# Patient Record
Sex: MALE | Race: WHITE | NOT HISPANIC OR LATINO | Employment: FULL TIME | URBAN - METROPOLITAN AREA
[De-identification: names, ages, dates, MRNs, and addresses within clinical notes are randomized per-mention and may not be internally consistent; named-entity substitution may affect disease eponyms.]

---

## 2018-07-12 ENCOUNTER — HOSPITAL ENCOUNTER (OUTPATIENT)
Dept: GENERAL RADIOLOGY | Facility: HOSPITAL | Age: 53
Discharge: HOME | End: 2018-07-12
Attending: FAMILY MEDICINE | Admitting: FAMILY MEDICINE

## 2019-04-24 ENCOUNTER — HOSPITAL ENCOUNTER (OUTPATIENT)
Dept: OTHER | Facility: HOSPITAL | Age: 54
Discharge: HOME | End: 2019-04-24
Attending: FAMILY MEDICINE | Admitting: FAMILY MEDICINE

## 2019-05-01 ENCOUNTER — OFFICE (OUTPATIENT)
Dept: URBAN - METROPOLITAN AREA CLINIC 64 | Facility: CLINIC | Age: 54
End: 2019-05-01

## 2019-05-01 VITALS
HEART RATE: 71 BPM | WEIGHT: 315 LBS | DIASTOLIC BLOOD PRESSURE: 58 MMHG | HEIGHT: 76 IN | SYSTOLIC BLOOD PRESSURE: 121 MMHG

## 2019-05-01 DIAGNOSIS — R10.13 EPIGASTRIC PAIN: ICD-10-CM

## 2019-05-01 PROCEDURE — 99242 OFF/OP CONSLTJ NEW/EST SF 20: CPT | Performed by: INTERNAL MEDICINE

## 2019-05-01 RX ORDER — PANTOPRAZOLE SODIUM 40 MG/1
40 TABLET, DELAYED RELEASE ORAL
Qty: 30 | Refills: 11 | Status: ACTIVE
Start: 2019-05-01

## 2019-06-27 RX ORDER — COLCHICINE 0.6 MG/1
0.6 TABLET ORAL DAILY
COMMUNITY
End: 2019-07-26 | Stop reason: SDUPTHER

## 2019-06-27 RX ORDER — ALBUTEROL SULFATE 90 UG/1
2 AEROSOL, METERED RESPIRATORY (INHALATION) EVERY 4 HOURS PRN
COMMUNITY
End: 2021-05-17

## 2019-06-27 RX ORDER — IBUPROFEN 800 MG/1
800 TABLET ORAL 3 TIMES DAILY PRN
COMMUNITY
Start: 2016-05-26 | End: 2020-01-13

## 2019-06-27 RX ORDER — IBUPROFEN 800 MG/1
TABLET ORAL
Qty: 90 TABLET | Refills: 3 | Status: SHIPPED | OUTPATIENT
Start: 2019-06-27 | End: 2019-07-15 | Stop reason: SDUPTHER

## 2019-06-27 RX ORDER — LORATADINE 10 MG/1
10 CAPSULE, LIQUID FILLED ORAL DAILY
COMMUNITY
End: 2019-10-22 | Stop reason: SDUPTHER

## 2019-06-27 NOTE — TELEPHONE ENCOUNTER
Patient was last seen on, 4-    Patient has an appointment to be seen on, Does not have a follow up appointment scheduled.

## 2019-07-15 ENCOUNTER — OFFICE VISIT (OUTPATIENT)
Dept: FAMILY MEDICINE CLINIC | Facility: CLINIC | Age: 54
End: 2019-07-15

## 2019-07-15 VITALS
HEART RATE: 68 BPM | WEIGHT: 315 LBS | OXYGEN SATURATION: 97 % | SYSTOLIC BLOOD PRESSURE: 140 MMHG | TEMPERATURE: 98.1 F | HEIGHT: 75 IN | BODY MASS INDEX: 39.17 KG/M2 | RESPIRATION RATE: 18 BRPM | DIASTOLIC BLOOD PRESSURE: 86 MMHG

## 2019-07-15 DIAGNOSIS — M25.532 BILATERAL WRIST PAIN: ICD-10-CM

## 2019-07-15 DIAGNOSIS — M25.531 BILATERAL WRIST PAIN: ICD-10-CM

## 2019-07-15 DIAGNOSIS — M54.2 NECK PAIN: Primary | ICD-10-CM

## 2019-07-15 DIAGNOSIS — R20.0 BILATERAL HAND NUMBNESS: ICD-10-CM

## 2019-07-15 PROCEDURE — 99214 OFFICE O/P EST MOD 30 MIN: CPT | Performed by: FAMILY MEDICINE

## 2019-07-15 RX ORDER — PANTOPRAZOLE SODIUM 40 MG/1
TABLET, DELAYED RELEASE ORAL
COMMUNITY
Start: 2019-07-09 | End: 2021-05-21 | Stop reason: SDUPTHER

## 2019-07-15 NOTE — PROGRESS NOTES
Rooming Tab(CC,VS,Pt Hx,Fall Screen)  Chief Complaint   Patient presents with   • Numbness       Subjective   Alexander Wayne is a 54 y.o. male.     Upper Extremity Issue   This is a new problem. The current episode started more than 1 year ago. The problem occurs intermittently. Associated symptoms include neck pain and numbness. Pertinent negatives include no abdominal pain, arthralgias, chest pain, coughing, fever, nausea, rash or vomiting.        The following portions of the patient's history were reviewed and updated as appropriate: allergies, current medications, past family history, past medical history, past social history, past surgical history and problem list.    Patient Care Team:  Melissa Waldron MD as PCP - General  Melissa Waldron MD as PCP - Family Medicine    Problem List Tab  Medications Tab  Synopsis Tab  Chart Review Tab  Care Everywhere Tab  Immunizations Tab  Patient History Tab    Social History     Tobacco Use   • Smoking status: Former Smoker     Last attempt to quit: 10/21/2015     Years since quitting: 3.7   • Smokeless tobacco: Former User   Substance Use Topics   • Alcohol use: Yes     Comment: SOCIAL       Review of Systems   Constitutional: Negative for activity change and fever.   HENT: Negative for ear pain, rhinorrhea, sinus pressure and voice change.    Eyes: Negative for visual disturbance.   Respiratory: Negative for cough and shortness of breath.    Cardiovascular: Negative for chest pain.   Gastrointestinal: Negative for abdominal pain, diarrhea, nausea and vomiting.   Endocrine: Negative for cold intolerance and heat intolerance.   Genitourinary: Negative for frequency and urgency.   Musculoskeletal: Positive for neck pain. Negative for arthralgias.   Skin: Negative for rash.   Neurological: Positive for numbness. Negative for syncope.   Hematological: Does not bruise/bleed easily.   Psychiatric/Behavioral: Negative for depressed mood. The patient is not nervous/anxious.   "      Objective     Rooming Tab(CC,VS,Pt Hx,Fall Screen)  /86   Pulse 68   Temp 98.1 °F (36.7 °C)   Resp 18   Ht 190.5 cm (75\")   Wt (!) 169 kg (372 lb 3.2 oz)   SpO2 97%   BMI 46.52 kg/m²     Body mass index is 46.52 kg/m².    Physical Exam   Constitutional: He is oriented to person, place, and time. He appears well-developed and well-nourished. He is cooperative.   Neck: Normal range of motion and full passive range of motion without pain. Neck supple.   Cardiovascular: Normal rate, regular rhythm and normal heart sounds. Exam reveals no gallop and no friction rub.   No murmur heard.  Pulmonary/Chest: Effort normal and breath sounds normal. He has no wheezes. He has no rales.   Musculoskeletal:        Right wrist: He exhibits tenderness.        Left wrist: He exhibits tenderness.   + phalen and tinel   Neurological: He is alert and oriented to person, place, and time. Coordination normal.   Skin: Skin is warm and dry.   Psychiatric: He has a normal mood and affect.   Vitals reviewed.             Assessment/Plan   Order Review Tab  Health Maintenance Tab  Patient Plan/Order Tab    Problem List Items Addressed This Visit     None      Visit Diagnoses     Neck pain    -  Primary    Relevant Orders    XR Spine Cervical Complete 4 or 5 View    Bilateral wrist pain        Relevant Orders    EMG All Extremities (Completed)    Nerve Conduction Test All Extremities    Bilateral hand numbness            Will get EMG and send to hand surgeon    Wrapup Tab  No Follow-up on file.                   "

## 2019-07-19 ENCOUNTER — HOSPITAL ENCOUNTER (OUTPATIENT)
Dept: NEUROLOGY | Facility: HOSPITAL | Age: 54
Discharge: HOME | End: 2019-07-19
Admitting: FAMILY MEDICINE

## 2019-07-19 DIAGNOSIS — M25.531 BILATERAL WRIST PAIN: ICD-10-CM

## 2019-07-19 DIAGNOSIS — M25.532 BILATERAL WRIST PAIN: ICD-10-CM

## 2019-07-19 PROCEDURE — 95910 NRV CNDJ TEST 7-8 STUDIES: CPT

## 2019-07-19 PROCEDURE — 95886 MUSC TEST DONE W/N TEST COMP: CPT | Performed by: PSYCHIATRY & NEUROLOGY

## 2019-07-19 PROCEDURE — 95886 MUSC TEST DONE W/N TEST COMP: CPT

## 2019-07-19 PROCEDURE — 95910 NRV CNDJ TEST 7-8 STUDIES: CPT | Performed by: PSYCHIATRY & NEUROLOGY

## 2019-07-19 NOTE — PROCEDURES
EMG REPORT    Indication: Hand numbness    Findings: Right median sensory study showed no reliable response.  Left median sensory latency was prolonged at 6.3 ms.  Bilateral ulnar sensory and motor study showed normal latencies and amplitudes.  Right median motor study showed no reliable response.  Left median motor study showed a prolonged distal latency of 14.9 ms with small amplitude response and velocity of 44.2 m/s.    Concentric needle EMG of bilateral deltoid, triceps, brachioradialis, extensor digitorum, and first dorsal interosseous muscles showed no abnormality.    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  The abnormalities are severe in degree and most likely emanates from the carpal tunnel region.  Needle EMG of both upper extremities failed to show evidence of superimposed cervical radiculopathy.       Rashawn Negrete M.D.

## 2019-07-26 RX ORDER — COLCHICINE 0.6 MG/1
TABLET ORAL
Qty: 60 TABLET | Refills: 12 | Status: SHIPPED | OUTPATIENT
Start: 2019-07-26 | End: 2020-09-22

## 2019-10-22 RX ORDER — LORATADINE 10 MG/1
TABLET ORAL
Qty: 30 TABLET | Refills: 12 | Status: SHIPPED | OUTPATIENT
Start: 2019-10-22 | End: 2020-12-28

## 2020-01-13 RX ORDER — IBUPROFEN 800 MG/1
TABLET ORAL
Qty: 90 TABLET | Refills: 3 | Status: SHIPPED | OUTPATIENT
Start: 2020-01-13 | End: 2020-07-26

## 2020-04-27 ENCOUNTER — TELEPHONE (OUTPATIENT)
Dept: FAMILY MEDICINE CLINIC | Facility: CLINIC | Age: 55
End: 2020-04-27

## 2020-04-27 DIAGNOSIS — R21 RASH: ICD-10-CM

## 2020-04-27 DIAGNOSIS — R21 RASH: Primary | ICD-10-CM

## 2020-04-29 ENCOUNTER — TELEPHONE (OUTPATIENT)
Dept: NEUROLOGY | Facility: CLINIC | Age: 55
End: 2020-04-29

## 2020-04-29 NOTE — TELEPHONE ENCOUNTER
We cannot supply order until pt is seen. He is to be seen yearly. He will need new sleep appt with Dr. Seipel in order to get anything from us. Please call and schedule. Thanks

## 2020-04-29 NOTE — TELEPHONE ENCOUNTER
Pt is calling stating he is needing a new Mask for his CPAP machine. Pt hasn't seen Dr. Seipel since 2016.  Does pt need to be scheduled as a New patient with Dr. Seipel to get his CPAP supplies or can an order be sent to [email or web address]?      Please advise [phone]

## 2020-04-30 NOTE — TELEPHONE ENCOUNTER
Called pt and he does not want to make an appt at this time. I advised for pt to call us when he is ready to schedule an appt.     JED

## 2020-07-26 RX ORDER — IBUPROFEN 800 MG/1
TABLET ORAL
Qty: 90 TABLET | Refills: 3 | Status: SHIPPED | OUTPATIENT
Start: 2020-07-26 | End: 2021-11-29

## 2020-09-22 RX ORDER — COLCHICINE 0.6 MG/1
TABLET ORAL
Qty: 60 TABLET | Refills: 12 | Status: SHIPPED | OUTPATIENT
Start: 2020-09-22 | End: 2020-09-25

## 2020-09-25 RX ORDER — COLCHICINE 0.6 MG/1
TABLET ORAL
Qty: 60 TABLET | Refills: 12 | Status: SHIPPED | OUTPATIENT
Start: 2020-09-25 | End: 2020-09-28 | Stop reason: SDUPTHER

## 2020-09-28 ENCOUNTER — TELEPHONE (OUTPATIENT)
Dept: FAMILY MEDICINE CLINIC | Facility: CLINIC | Age: 55
End: 2020-09-28

## 2020-09-28 RX ORDER — COLCHICINE 0.6 MG/1
0.6 TABLET ORAL 2 TIMES DAILY PRN
Qty: 60 TABLET | Refills: 12 | Status: SHIPPED | OUTPATIENT
Start: 2020-09-28 | End: 2021-11-29

## 2020-10-20 ENCOUNTER — OFFICE VISIT (OUTPATIENT)
Dept: FAMILY MEDICINE CLINIC | Facility: CLINIC | Age: 55
End: 2020-10-20

## 2020-10-20 VITALS
HEART RATE: 65 BPM | DIASTOLIC BLOOD PRESSURE: 86 MMHG | TEMPERATURE: 98.3 F | BODY MASS INDEX: 38.36 KG/M2 | HEIGHT: 76 IN | WEIGHT: 315 LBS | SYSTOLIC BLOOD PRESSURE: 132 MMHG | RESPIRATION RATE: 18 BRPM | OXYGEN SATURATION: 92 %

## 2020-10-20 DIAGNOSIS — E66.01 MORBID OBESITY (HCC): ICD-10-CM

## 2020-10-20 DIAGNOSIS — Z00.01 ENCOUNTER FOR GENERAL ADULT MEDICAL EXAMINATION WITH ABNORMAL FINDINGS: Primary | ICD-10-CM

## 2020-10-20 DIAGNOSIS — M25.522 LEFT ELBOW PAIN: ICD-10-CM

## 2020-10-20 DIAGNOSIS — M77.12 LATERAL EPICONDYLITIS OF LEFT ELBOW: ICD-10-CM

## 2020-10-20 DIAGNOSIS — Z13.220 SCREENING FOR HYPERLIPIDEMIA: ICD-10-CM

## 2020-10-20 DIAGNOSIS — Z12.5 SCREENING FOR PROSTATE CANCER: ICD-10-CM

## 2020-10-20 PROBLEM — M1A.09X0 IDIOPATHIC CHRONIC GOUT OF MULTIPLE SITES WITHOUT TOPHUS: Status: ACTIVE | Noted: 2020-10-20

## 2020-10-20 PROBLEM — K40.90 INGUINAL HERNIA: Status: ACTIVE | Noted: 2020-10-20

## 2020-10-20 PROBLEM — J30.1 SEASONAL ALLERGIC RHINITIS DUE TO POLLEN: Status: ACTIVE | Noted: 2020-10-20

## 2020-10-20 PROBLEM — L57.0 AK (ACTINIC KERATOSIS): Status: ACTIVE | Noted: 2020-10-20

## 2020-10-20 PROBLEM — L03.115 CELLULITIS OF RIGHT LOWER EXTREMITY: Status: RESOLVED | Noted: 2020-10-20 | Resolved: 2020-10-20

## 2020-10-20 PROBLEM — D69.6 THROMBOCYTOPENIA: Status: ACTIVE | Noted: 2020-10-20

## 2020-10-20 PROBLEM — K27.9 PEPTIC ULCER: Status: ACTIVE | Noted: 2020-10-20

## 2020-10-20 PROBLEM — Z87.891 HISTORY OF TOBACCO USE: Status: ACTIVE | Noted: 2020-10-20

## 2020-10-20 PROBLEM — R60.0 EDEMA, LOWER EXTREMITY: Status: ACTIVE | Noted: 2020-10-20

## 2020-10-20 PROBLEM — L03.115 CELLULITIS OF RIGHT LOWER EXTREMITY: Status: ACTIVE | Noted: 2020-10-20

## 2020-10-20 PROBLEM — R10.11 RUQ PAIN: Status: ACTIVE | Noted: 2020-10-20

## 2020-10-20 PROBLEM — Z86.19 HISTORY OF CHICKEN POX: Status: ACTIVE | Noted: 2020-10-20

## 2020-10-20 PROBLEM — M25.561 PAIN, JOINT, KNEE, RIGHT: Status: ACTIVE | Noted: 2020-10-20

## 2020-10-20 PROBLEM — M77.10 LATERAL EPICONDYLITIS (TENNIS ELBOW): Status: ACTIVE | Noted: 2020-10-20

## 2020-10-20 PROBLEM — H91.93 BILATERAL HEARING LOSS: Status: ACTIVE | Noted: 2020-10-20

## 2020-10-20 PROBLEM — K42.9 UMBILICAL HERNIA: Status: ACTIVE | Noted: 2020-10-20

## 2020-10-20 PROBLEM — R09.82 POST-NASAL DRIP: Status: ACTIVE | Noted: 2020-10-20

## 2020-10-20 LAB
BILIRUB BLD-MCNC: NEGATIVE MG/DL
CLARITY, POC: CLEAR
COLOR UR: YELLOW
GLUCOSE UR STRIP-MCNC: NEGATIVE MG/DL
KETONES UR QL: NEGATIVE
LEUKOCYTE EST, POC: NEGATIVE
NITRITE UR-MCNC: NEGATIVE MG/ML
PH UR: 6.5 [PH] (ref 5–8)
PROT UR STRIP-MCNC: ABNORMAL MG/DL
RBC # UR STRIP: NEGATIVE /UL
SP GR UR: 1 (ref 1–1.03)
UROBILINOGEN UR QL: NORMAL

## 2020-10-20 PROCEDURE — 99212 OFFICE O/P EST SF 10 MIN: CPT | Performed by: FAMILY MEDICINE

## 2020-10-20 PROCEDURE — 99396 PREV VISIT EST AGE 40-64: CPT | Performed by: FAMILY MEDICINE

## 2020-10-20 PROCEDURE — 81003 URINALYSIS AUTO W/O SCOPE: CPT | Performed by: FAMILY MEDICINE

## 2020-10-20 NOTE — PROGRESS NOTES
"  Chief Complaint   Patient presents with   • Annual Exam   • Elbow Pain   • Cough       Subjective   Alexander Wayne is a 55 y.o. male here for a Annual Visit. Energy level is described as fair and he is sleeping poorly. He sleeps 5 hours nightly. Patient exercises regularly 3 times weekly. Nutrition is described as in general, a \"healthy\" diet  . His   libido is normal. He reports that he does not perform monthly testicular exam.    Elbow Pain  This is a new problem. The current episode started more than 1 month ago (3 months ago flipped tractor on top of himself did not got to ER). The problem occurs intermittently. The problem has been waxing and waning. Associated symptoms include coughing and joint swelling. Pertinent negatives include no abdominal pain, arthralgias, chest pain, fatigue, fever, headaches, nausea, rash, sore throat, visual change, vomiting or weakness. Exacerbated by: working it will start aching. He has tried acetaminophen for the symptoms.   Cough  This is a new (due to post nasal drip) problem. The current episode started 1 to 4 weeks ago. The problem has been gradually worsening. The problem occurs every few hours. The cough is productive of sputum. Associated symptoms include nasal congestion and postnasal drip. Pertinent negatives include no chest pain, ear congestion, ear pain, fever, headaches, rash, rhinorrhea, sore throat or shortness of breath. Nothing aggravates the symptoms. Risk factors for lung disease include animal exposure. He has tried nothing for the symptoms. The treatment provided mild relief.        I personally reviewed and updated the patient's allergies, medications, problem list, and past medical, surgical, social, and family history.     Allergies:  Allergies   Allergen Reactions   • Ciprofloxacin Hives       Social History:  Social History     Socioeconomic History   • Marital status:      Spouse name: Not on file   • Number of children: Not on file   • Years " of education: Not on file   • Highest education level: Not on file   Tobacco Use   • Smoking status: Former Smoker     Quit date: 10/21/2015     Years since quittin.0   • Smokeless tobacco: Former User   Substance and Sexual Activity   • Alcohol use: Yes     Comment: SOCIAL   • Drug use: No       Family History:  Family History   Problem Relation Age of Onset   • Colon cancer Mother    • Breast cancer Mother    • Hypertension Father    • Diabetes Maternal Grandmother    • Lung cancer Maternal Grandfather    • Liver cancer Maternal Grandfather        Past Medical History :  Active Ambulatory Problems     Diagnosis Date Noted   • Obstructive sleep apnea 2016   • AK (actinic keratosis) 10/20/2020   • Bilateral hearing loss 10/20/2020   • Edema, lower extremity 10/20/2020   • History of chicken pox 10/20/2020   • Idiopathic chronic gout of multiple sites without tophus 10/20/2020   • Inguinal hernia 10/20/2020   • Morbid obesity (CMS/HCC) 10/20/2020   • History of tobacco use 10/20/2020   • Pain, joint, knee, right 10/20/2020   • Peptic ulcer 10/20/2020   • Post-nasal drip 10/20/2020   • Screening for prostate cancer 10/20/2020   • Seasonal allergic rhinitis due to pollen 10/20/2020   • Thrombocytopenia (CMS/HCC) 10/20/2020   • Umbilical hernia 10/20/2020   • Left elbow pain 10/20/2020   • Lateral epicondylitis (tennis elbow) 10/20/2020     Resolved Ambulatory Problems     Diagnosis Date Noted   • Cellulitis of right lower extremity 10/20/2020   • RUQ pain 10/20/2020     Past Medical History:   Diagnosis Date   • Chronic idiopathic gout of multiple sites without tophus    • Seasonal allergic rhinitis    • Sleep apnea        Medication List:    Current Outpatient Medications:   •  albuterol sulfate  (90 Base) MCG/ACT inhaler, Inhale 2 puffs Every 4 (Four) Hours As Needed. Dx: R05 (Cough), Disp: , Rfl:   •  colchicine 0.6 MG tablet, Take 1 tablet by mouth 2 (Two) Times a Day As Needed for Muscle / Joint  "Pain., Disp: 60 tablet, Rfl: 12  •  ibuprofen (ADVIL,MOTRIN) 800 MG tablet, TAKE 1 TABLET BY MOUTH THREE TIMES A DAY AS NEEDED, Disp: 90 tablet, Rfl: 3  •  loratadine (CLARITIN) 10 MG tablet, TAKE 1 TABLET BY MOUTH EVERY DAY, Disp: 30 tablet, Rfl: 12  •  mupirocin (Bactroban) 2 % ointment, Apply  topically to the appropriate area as directed 3 (Three) Times a Day., Disp: 1 each, Rfl: 0  •  pantoprazole (PROTONIX) 40 MG EC tablet, , Disp: , Rfl:     Past Surgical History:  Past Surgical History:   Procedure Laterality Date   • INGUINAL HERNIA REPAIR     • KNEE LIGAMENT RECONSTRUCTION     • LAMINECTOMY      3    • LUMBAR DISCECTOMY         Depression Screen:   No flowsheet data found.    Fall Risk Screen:  [facility] Fall Risk Assessment has not been completed.    Review Of Systems:  Review of Systems   Constitutional: Negative for activity change, fatigue and fever.   HENT: Positive for postnasal drip. Negative for ear pain, rhinorrhea, sinus pressure, sore throat and voice change.    Eyes: Negative for visual disturbance.   Respiratory: Positive for cough. Negative for shortness of breath.    Cardiovascular: Negative for chest pain.   Gastrointestinal: Negative for abdominal pain, diarrhea, nausea and vomiting.   Endocrine: Negative for cold intolerance and heat intolerance.   Genitourinary: Negative for frequency and urgency.   Musculoskeletal: Positive for joint swelling. Negative for arthralgias.   Skin: Negative for rash.   Neurological: Negative for syncope and weakness.   Hematological: Does not bruise/bleed easily.   Psychiatric/Behavioral: Negative for depressed mood. The patient is not nervous/anxious.        Physical Exam:  Vital Signs:  Visit Vitals  /86   Pulse 65   Temp 98.3 °F (36.8 °C)   Resp 18   Ht 193 cm (76\")   Wt (!) 166 kg (365 lb 12.8 oz)   SpO2 92%   BMI 44.53 kg/m²       Physical Exam  Vitals signs reviewed.   Constitutional:       General: He is not in acute distress.     Appearance: " Normal appearance. He is well-developed. He is not diaphoretic.   HENT:      Head: Normocephalic and atraumatic.      Right Ear: External ear normal. No decreased hearing noted. No tenderness. No middle ear effusion. Tympanic membrane is not erythematous or bulging.      Left Ear: External ear normal. No decreased hearing noted. No tenderness.  No middle ear effusion. Tympanic membrane is not erythematous or bulging.      Nose: Nose normal.      Mouth/Throat:      Pharynx: No oropharyngeal exudate or posterior oropharyngeal erythema.   Eyes:      General: No scleral icterus.        Right eye: No discharge.         Left eye: No discharge.      Conjunctiva/sclera: Conjunctivae normal.      Pupils: Pupils are equal, round, and reactive to light.   Neck:      Musculoskeletal: Normal range of motion and neck supple.      Thyroid: No thyromegaly.      Trachea: No tracheal deviation.   Cardiovascular:      Rate and Rhythm: Normal rate and regular rhythm.      Heart sounds: Normal heart sounds. No murmur. No friction rub. No gallop.    Pulmonary:      Effort: Pulmonary effort is normal. No respiratory distress.      Breath sounds: Normal breath sounds. No stridor. No wheezing or rales.   Abdominal:      General: Bowel sounds are normal. There is no distension.      Palpations: Abdomen is soft. There is no mass.      Tenderness: There is no abdominal tenderness. There is no guarding or rebound.      Hernia: There is no hernia in the left inguinal area.   Genitourinary:     Penis: Normal. No tenderness or discharge.       Scrotum/Testes: Normal.         Right: Mass, tenderness or swelling not present.         Left: Mass, tenderness or swelling not present.   Musculoskeletal: Normal range of motion.         General: No deformity.      Left elbow: Tenderness found. Lateral epicondyle tenderness noted.   Skin:     General: Skin is warm and dry.      Capillary Refill: Capillary refill takes less than 2 seconds.      Coloration:  Skin is not pale.      Findings: No erythema or rash.   Neurological:      Mental Status: He is alert and oriented to person, place, and time. He is not disoriented.      Cranial Nerves: No cranial nerve deficit.      Sensory: No sensory deficit.      Motor: No tremor, atrophy or abnormal muscle tone.      Coordination: Coordination normal.      Gait: Gait normal.      Deep Tendon Reflexes: Reflexes are normal and symmetric. Reflexes normal.   Psychiatric:         Behavior: Behavior normal. Behavior is cooperative.         Thought Content: Thought content normal.         Judgment: Judgment normal.           Assessment and Plan:  Problem List Items Addressed This Visit        Digestive    Morbid obesity (CMS/HCC)       Nervous and Auditory    Left elbow pain    Current Assessment & Plan     Discussed nsaids, lat strap         Relevant Orders    XR Elbow 2 View Left       Musculoskeletal and Integument    Lateral epicondylitis (tennis elbow)       Other    Screening for prostate cancer    Overview     Discussed injury prevention, diet and exercise, safe sexual practices, and screening for common diseases. Encouraged use of sunscreen and seatbelts. Avoidance of tobacco encouraged. Limitation or avoidance of alcohol encouraged. Recommend yearly dental and eye exams. Also discussed monitoring of blood pressure, lipids.  He has had colonoscopy (at age 47)    he has had his prostate looked at         Relevant Orders    PSA Screen      Other Visit Diagnoses     Encounter for general adult medical examination with abnormal findings    -  Primary    Relevant Orders    POCT urinalysis dipstick, multipro (Completed)    Comprehensive Metabolic Panel    CBC & Differential    TSH    Screening for hyperlipidemia        Relevant Orders    Lipid Panel With / Chol / HDL Ratio          An After Visit Summary and PPPS were given to the patient.       Discussed injury prevention, diet and exercise, safe sexual practices, and screening  for common diseases. Encouraged use of sunscreen and seatbelts. Discussed timing of screenings. Encouraged monthly testicular exams, yearly physical exams. Avoidance of tobacco encouraged. Limitation or avoidance of alcohol encouraged. Recommend yearly dental and eye exams. Also discussed monitoring of blood pressure, lipids.     I wore protective equipment throughout this patient encounter to include mask, gloves and eye protection. Hand hygiene was performed before donning protective equipment and after removal when leaving the room.

## 2020-10-25 PROBLEM — R10.11 RUQ PAIN: Status: RESOLVED | Noted: 2020-10-20 | Resolved: 2020-10-25

## 2020-12-01 ENCOUNTER — HOSPITAL ENCOUNTER (OUTPATIENT)
Dept: ULTRASOUND IMAGING | Facility: HOSPITAL | Age: 55
Discharge: HOME | End: 2020-12-01

## 2020-12-01 ENCOUNTER — OFFICE VISIT (OUTPATIENT)
Dept: FAMILY MEDICINE CLINIC | Facility: CLINIC | Age: 55
End: 2020-12-01

## 2020-12-01 VITALS
TEMPERATURE: 98.6 F | HEIGHT: 76 IN | HEART RATE: 80 BPM | SYSTOLIC BLOOD PRESSURE: 140 MMHG | DIASTOLIC BLOOD PRESSURE: 78 MMHG | BODY MASS INDEX: 38.36 KG/M2 | OXYGEN SATURATION: 95 % | WEIGHT: 315 LBS | RESPIRATION RATE: 18 BRPM

## 2020-12-01 DIAGNOSIS — M79.604 RIGHT LEG PAIN: ICD-10-CM

## 2020-12-01 DIAGNOSIS — M79.604 RIGHT LEG PAIN: Primary | ICD-10-CM

## 2020-12-01 PROCEDURE — 99213 OFFICE O/P EST LOW 20 MIN: CPT | Performed by: FAMILY MEDICINE

## 2020-12-01 NOTE — PROGRESS NOTES
Subjective   Alexander Wayne is a 55 y.o. male.     Chief Complaint   Patient presents with   • Leg Swelling   • Leg Pain               Leg Swelling  This is a new (left leg and knee) problem. The current episode started more than 1 month ago. The problem occurs intermittently. The problem has been gradually worsening. Pertinent negatives include no abdominal pain, arthralgias, chest pain, coughing, fever, nausea, rash or vomiting. Associated symptoms comments: Warm to touch 2 days ago. . Nothing aggravates the symptoms. He has tried NSAIDs (colchicine) for the symptoms. The treatment provided mild relief.   Leg Pain   The incident occurred more than 1 week ago (leg cirrculation on the right leg 6 to 7years). There was no injury mechanism. The pain is present in the right thigh, right ankle, right heel, right knee and right foot. The quality of the pain is described as cramping. The pain is at a severity of 0/10. The patient is experiencing no pain. Associated symptoms include tingling. Associated symptoms comments: Right foot. He reports no foreign bodies present. The symptoms are aggravated by palpation. He has tried elevation (compression socks ) for the symptoms. The treatment provided no relief.        The following portions of the patient's history were reviewed and updated as appropriate: allergies, current medications, past family history, past medical history, past social history, past surgical history and problem list.    Allergies:  Allergies   Allergen Reactions   • Ciprofloxacin Hives       Social History:  Social History     Socioeconomic History   • Marital status:      Spouse name: Not on file   • Number of children: Not on file   • Years of education: Not on file   • Highest education level: Not on file   Tobacco Use   • Smoking status: Former Smoker     Quit date: 10/21/2015     Years since quittin.1   • Smokeless tobacco: Former User   Substance and Sexual Activity   • Alcohol use: Yes      Comment: SOCIAL   • Drug use: No       Family History:  Family History   Problem Relation Age of Onset   • Colon cancer Mother    • Breast cancer Mother    • Hypertension Father    • Diabetes Maternal Grandmother    • Lung cancer Maternal Grandfather    • Liver cancer Maternal Grandfather        Past Medical History :  Patient Active Problem List   Diagnosis   • Obstructive sleep apnea   • AK (actinic keratosis)   • Bilateral hearing loss   • Edema, lower extremity   • History of chicken pox   • Idiopathic chronic gout of multiple sites without tophus   • Inguinal hernia   • Morbid obesity (CMS/HCC)   • History of tobacco use   • Pain, joint, knee, right   • Peptic ulcer   • Post-nasal drip   • Screening for prostate cancer   • Seasonal allergic rhinitis due to pollen   • Thrombocytopenia (CMS/HCC)   • Umbilical hernia   • Left elbow pain   • Lateral epicondylitis (tennis elbow)   • Right leg pain       Medication List:    Current Outpatient Medications:   •  colchicine 0.6 MG tablet, Take 1 tablet by mouth 2 (Two) Times a Day As Needed for Muscle / Joint Pain., Disp: 60 tablet, Rfl: 12  •  ibuprofen (ADVIL,MOTRIN) 800 MG tablet, TAKE 1 TABLET BY MOUTH THREE TIMES A DAY AS NEEDED, Disp: 90 tablet, Rfl: 3  •  loratadine (CLARITIN) 10 MG tablet, TAKE 1 TABLET BY MOUTH EVERY DAY, Disp: 30 tablet, Rfl: 12  •  mupirocin (Bactroban) 2 % ointment, Apply  topically to the appropriate area as directed 3 (Three) Times a Day., Disp: 1 each, Rfl: 0  •  albuterol sulfate  (90 Base) MCG/ACT inhaler, Inhale 2 puffs Every 4 (Four) Hours As Needed. Dx: R05 (Cough), Disp: , Rfl:   •  pantoprazole (PROTONIX) 40 MG EC tablet, , Disp: , Rfl:     Past Surgical History:  Past Surgical History:   Procedure Laterality Date   • INGUINAL HERNIA REPAIR     • KNEE LIGAMENT RECONSTRUCTION     • LAMINECTOMY      3    • LUMBAR DISCECTOMY         Review of Systems:  Review of Systems   Constitutional: Negative for activity change and  "fever.   HENT: Negative for ear pain, rhinorrhea, sinus pressure and voice change.    Eyes: Negative for visual disturbance.   Respiratory: Negative for cough and shortness of breath.    Cardiovascular: Negative for chest pain.   Gastrointestinal: Negative for abdominal pain, diarrhea, nausea and vomiting.   Endocrine: Negative for cold intolerance and heat intolerance.   Genitourinary: Negative for frequency and urgency.   Musculoskeletal: Negative for arthralgias.   Skin: Negative for rash.   Neurological: Positive for tingling. Negative for syncope.   Hematological: Does not bruise/bleed easily.   Psychiatric/Behavioral: Negative for depressed mood. The patient is not nervous/anxious.        Physical Exam:  Vital Signs:  Visit Vitals  /78 (BP Location: Right arm)   Pulse 80   Temp 98.6 °F (37 °C) (Temporal)   Resp 18   Ht 193 cm (76\")   Wt (!) 172 kg (378 lb 12.8 oz)   SpO2 95%   BMI 46.11 kg/m²       Physical Exam  Vitals signs reviewed.   Constitutional:       Appearance: Normal appearance. He is well-developed.   HENT:      Head: Normocephalic and atraumatic.   Cardiovascular:      Rate and Rhythm: Normal rate and regular rhythm.      Pulses:           Dorsalis pedis pulses are 2+ on the right side.        Posterior tibial pulses are 2+ on the right side.      Heart sounds: Normal heart sounds. No murmur. No friction rub. No gallop.       Comments: Right leg with trace edema, middle of lower leg with vascular changes.   Pulmonary:      Effort: Pulmonary effort is normal.      Breath sounds: Normal breath sounds. No wheezing or rales.   Skin:     General: Skin is warm and dry.   Neurological:      Mental Status: He is alert and oriented to person, place, and time.      Coordination: Coordination normal.      Gait: Gait normal.   Psychiatric:         Behavior: Behavior is cooperative.         Assessment and Plan:    Problems Addressed this Visit        Nervous and Auditory    Right leg pain - Primary     " He has vascular changes and varicose veins. Attempted doppler but he declined secondary to cost. Trying to get a better price for him. Doubt DVT. No s/s of DVT.           Diagnoses       Codes Comments    Right leg pain    -  Primary ICD-10-CM: M79.604  ICD-9-CM: 729.5           An After Visit Summary and PPPS were given to the patient.             I wore protective equipment throughout this patient encounter to include mask, gloves and eye protection. Hand hygiene was performed before donning protective equipment and after removal when leaving the room.

## 2020-12-04 ENCOUNTER — TELEPHONE (OUTPATIENT)
Dept: FAMILY MEDICINE CLINIC | Facility: CLINIC | Age: 55
End: 2020-12-04

## 2020-12-04 NOTE — TELEPHONE ENCOUNTER
Have you spoken with [facility] or [facility] on cost of the u/s of his right leg? If they are pricey, set him up with a vascualr surgeon

## 2020-12-04 NOTE — ASSESSMENT & PLAN NOTE
He has vascular changes and varicose veins. Attempted doppler but he declined secondary to cost. Trying to get a better price for him. Doubt DVT. No s/s of DVT.

## 2020-12-12 NOTE — TELEPHONE ENCOUNTER
Pt called stating that he cant  his medicine colchicine. Called pharmacy and they dont have it on our end. Can you resend it?   (M6) obeys commands

## 2020-12-28 ENCOUNTER — TELEPHONE (OUTPATIENT)
Dept: FAMILY MEDICINE CLINIC | Facility: CLINIC | Age: 55
End: 2020-12-28

## 2020-12-28 RX ORDER — LORATADINE 10 MG/1
TABLET ORAL
Qty: 90 TABLET | Refills: 3 | Status: SHIPPED | OUTPATIENT
Start: 2020-12-28 | End: 2022-02-27

## 2021-01-08 ENCOUNTER — TELEPHONE (OUTPATIENT)
Dept: FAMILY MEDICINE CLINIC | Facility: CLINIC | Age: 56
End: 2021-01-08

## 2021-01-08 DIAGNOSIS — M79.604 RIGHT LEG PAIN: ICD-10-CM

## 2021-01-08 NOTE — TELEPHONE ENCOUNTER
----- Message from Melissa Waldron MD sent at 1/8/2021  2:18 PM EST -----  I can not see the result from my phone. Can you go over this with me on phone or with on call?

## 2021-01-14 NOTE — TELEPHONE ENCOUNTER
He needs to see Dr Nelson at [facility]. He is a vascular surgeon. He can see him instead of me

## 2021-01-14 NOTE — TELEPHONE ENCOUNTER
Called pt and let him his results he said that it starting to look darker, and he said that he would like to get his varicous veins taken care of. I made him an appt for Monday

## 2021-01-25 ENCOUNTER — TELEPHONE (OUTPATIENT)
Dept: FAMILY MEDICINE CLINIC | Facility: CLINIC | Age: 56
End: 2021-01-25

## 2021-01-25 NOTE — TELEPHONE ENCOUNTER
Made pt and appt with Dr. Elias at [facility] for February 12, 2021 at 9:10am tried contacting the pt but was unsuccessful

## 2021-05-17 ENCOUNTER — OFFICE VISIT (OUTPATIENT)
Dept: FAMILY MEDICINE CLINIC | Facility: CLINIC | Age: 56
End: 2021-05-17

## 2021-05-17 VITALS
RESPIRATION RATE: 18 BRPM | OXYGEN SATURATION: 96 % | HEIGHT: 76 IN | HEART RATE: 71 BPM | WEIGHT: 315 LBS | SYSTOLIC BLOOD PRESSURE: 136 MMHG | BODY MASS INDEX: 38.36 KG/M2 | TEMPERATURE: 97.3 F | DIASTOLIC BLOOD PRESSURE: 88 MMHG

## 2021-05-17 DIAGNOSIS — M54.2 NECK PAIN: ICD-10-CM

## 2021-05-17 DIAGNOSIS — M54.12 CERVICAL RADICULOPATHY: ICD-10-CM

## 2021-05-17 DIAGNOSIS — M62.838 TRAPEZIUS MUSCLE SPASM: Primary | ICD-10-CM

## 2021-05-17 PROBLEM — I87.329 CHRONIC VENOUS HYPERTENSION WITH INFLAMMATION: Status: ACTIVE | Noted: 2021-02-12

## 2021-05-17 PROCEDURE — 99214 OFFICE O/P EST MOD 30 MIN: CPT | Performed by: FAMILY MEDICINE

## 2021-05-17 RX ORDER — CYCLOBENZAPRINE HCL 10 MG
10 TABLET ORAL NIGHTLY PRN
Qty: 30 TABLET | Refills: 0 | Status: SHIPPED | OUTPATIENT
Start: 2021-05-17 | End: 2021-06-23

## 2021-05-17 NOTE — PROGRESS NOTES
Subjective   Alexander Wayne is a 56 y.o. male.     Chief Complaint   Patient presents with   • Neck Pain       Pt said that his neck pain and left hand pain since his COVID shot. Which was 03/10/2021  Pt also said that he has been trouble sleeping he hasn't been seeing his sleep Dr and is needing a new mask      Neck Pain   This is a new problem. The current episode started more than 1 month ago. The problem occurs daily. The problem has been gradually worsening. The pain is associated with nothing. Pain location: back of neck low and sides. The symptoms are aggravated by bending, coughing and twisting. Associated symptoms include headaches. Pertinent negatives include no fever, leg pain, pain with swallowing, photophobia, syncope, trouble swallowing, visual change or weakness. Treatments tried: ibuprofen 800mg. The treatment provided mild relief.      Neck pain started in March. He kept his head down for a while afterwards. Now he is hurting. Pain radiates down left arm. Hurts to turn head. Tingling in first two digits.     I personally reviewed and updated the patient's allergies, medications, problem list, and past medical, surgical, social, and family history. I have reviewed and confirmed the accuracy of the History of Present Illness and Review of Symptoms as documented by the MA/LPN/RN. Melissa Waldron MD    Allergies:  Allergies   Allergen Reactions   • Ciprofloxacin Hives       Social History:  Social History     Socioeconomic History   • Marital status:      Spouse name: Not on file   • Number of children: Not on file   • Years of education: Not on file   • Highest education level: Not on file   Tobacco Use   • Smoking status: Former Smoker     Quit date: 10/21/2015     Years since quittin.5   • Smokeless tobacco: Former User   Substance and Sexual Activity   • Alcohol use: Yes     Comment: SOCIAL   • Drug use: No       Family History:  Family History   Problem Relation Age of Onset   • Colon  cancer Mother    • Breast cancer Mother    • Hypertension Father    • Diabetes Maternal Grandmother    • Lung cancer Maternal Grandfather    • Liver cancer Maternal Grandfather        Past Medical History :  Patient Active Problem List   Diagnosis   • Obstructive sleep apnea   • AK (actinic keratosis)   • Bilateral hearing loss   • Edema, lower extremity   • History of chicken pox   • Idiopathic chronic gout of multiple sites without tophus   • Inguinal hernia   • Morbid obesity (CMS/HCC)   • History of tobacco use   • Pain, joint, knee, right   • Peptic ulcer   • Post-nasal drip   • Screening for prostate cancer   • Seasonal allergic rhinitis due to pollen   • Thrombocytopenia (CMS/HCC)   • Umbilical hernia   • Left elbow pain   • Lateral epicondylitis (tennis elbow)   • Right leg pain   • Chronic venous hypertension with inflammation   • Trapezius muscle spasm   • Neck pain   • Cervical radiculopathy       Medication List:    Current Outpatient Medications:   •  colchicine 0.6 MG tablet, Take 1 tablet by mouth 2 (Two) Times a Day As Needed for Muscle / Joint Pain., Disp: 60 tablet, Rfl: 12  •  ibuprofen (ADVIL,MOTRIN) 800 MG tablet, TAKE 1 TABLET BY MOUTH THREE TIMES A DAY AS NEEDED, Disp: 90 tablet, Rfl: 3  •  loratadine (CLARITIN) 10 MG tablet, TAKE 1 TABLET BY MOUTH EVERY DAY, Disp: 90 tablet, Rfl: 3  •  mupirocin (Bactroban) 2 % ointment, Apply  topically to the appropriate area as directed 3 (Three) Times a Day., Disp: 1 each, Rfl: 0  •  pantoprazole (PROTONIX) 40 MG EC tablet, , Disp: , Rfl:   •  cyclobenzaprine (FLEXERIL) 10 MG tablet, Take 1 tablet by mouth At Night As Needed for Muscle Spasms., Disp: 30 tablet, Rfl: 0    Past Surgical History:  Past Surgical History:   Procedure Laterality Date   • INGUINAL HERNIA REPAIR     • KNEE LIGAMENT RECONSTRUCTION     • LAMINECTOMY      3    • LUMBAR DISCECTOMY         Review of Systems:  Review of Systems   Constitutional: Negative for activity change and fever.  "  HENT: Negative for ear pain, rhinorrhea, sinus pressure, trouble swallowing and voice change.    Eyes: Negative for photophobia and visual disturbance.   Respiratory: Negative for cough and shortness of breath.    Cardiovascular: Negative for syncope.   Gastrointestinal: Negative for abdominal pain, diarrhea, nausea and vomiting.   Endocrine: Negative for cold intolerance and heat intolerance.   Genitourinary: Negative for frequency and urgency.   Musculoskeletal: Positive for neck pain. Negative for arthralgias.   Skin: Negative for rash.   Neurological: Negative for syncope and weakness.   Hematological: Does not bruise/bleed easily.   Psychiatric/Behavioral: Negative for depressed mood. The patient is not nervous/anxious.        Physical Exam:  Vital Signs:  Vital Signs:   /88   Pulse 71   Temp 97.3 °F (36.3 °C)   Resp 18   Ht 193 cm (76\")   Wt (!) 168 kg (369 lb 9.6 oz)   SpO2 96%   BMI 44.99 kg/m²     Result Review :                Physical Exam  Vitals reviewed.   Constitutional:       Appearance: Normal appearance. He is well-developed.   HENT:      Head: Normocephalic and atraumatic.   Eyes:      General:         Right eye: No discharge.         Left eye: No discharge.   Cardiovascular:      Rate and Rhythm: Normal rate and regular rhythm.      Heart sounds: Normal heart sounds. No murmur heard.   No friction rub. No gallop.    Pulmonary:      Effort: Pulmonary effort is normal. No respiratory distress.      Breath sounds: Normal breath sounds. No wheezing or rales.   Musculoskeletal:      Cervical back: Normal. No rigidity or tenderness. Normal range of motion.      Thoracic back: Spasms and tenderness present. Normal range of motion.   Skin:     General: Skin is warm and dry.      Findings: No rash.   Neurological:      Mental Status: He is alert and oriented to person, place, and time.      Motor: No weakness or tremor.      Coordination: Coordination is intact. Coordination normal.      " Gait: Gait normal.      Deep Tendon Reflexes: Reflexes normal.      Comments: Strength intact bilaterally   Psychiatric:         Behavior: Behavior is cooperative.         Assessment and Plan:  Problems Addressed this Visit        Musculoskeletal and Injuries    Trapezius muscle spasm - Primary     Ice three times a day for about 10-15 minutes for the first 1-2 days. Then may alternate heat and ice. Better body mechanics discussed. Home exercises discussed and hand out given. Discussed nsaids and if they can be taken. May need imaging and or PT if persists.  Discussed red flags, if there is severe pain, fever with pain, loss of movement in one or both legs pain, numbness in groin or both legs, trouble urinating or defecating on oneself, then patient is to go to the ER.            Relevant Medications    cyclobenzaprine (FLEXERIL) 10 MG tablet    Neck pain    Relevant Orders    XR Spine Cervical Complete 4 or 5 View       Neuro    Cervical radiculopathy     Caused by muscle spasm.   Will get neck xray         Relevant Medications    cyclobenzaprine (FLEXERIL) 10 MG tablet    Other Relevant Orders    XR Spine Cervical Complete 4 or 5 View      Diagnoses       Codes Comments    Trapezius muscle spasm    -  Primary ICD-10-CM: M62.838  ICD-9-CM: 728.85     Neck pain     ICD-10-CM: M54.2  ICD-9-CM: 723.1     Cervical radiculopathy     ICD-10-CM: M54.12  ICD-9-CM: 723.4            An After Visit Summary and PPPS were given to the patient.         I wore protective equipment throughout this patient encounter to include mask. Hand hygiene was performed before donning protective equipment and after removal when leaving the room.

## 2021-05-17 NOTE — ASSESSMENT & PLAN NOTE
Ice three times a day for about 10-15 minutes for the first 1-2 days. Then may alternate heat and ice. Better body mechanics discussed. Home exercises discussed and hand out given. Discussed nsaids and if they can be taken. May need imaging and or PT if persists.  Discussed red flags, if there is severe pain, fever with pain, loss of movement in one or both legs pain, numbness in groin or both legs, trouble urinating or defecating on oneself, then patient is to go to the ER.

## 2021-05-20 ENCOUNTER — TELEPHONE (OUTPATIENT)
Dept: FAMILY MEDICINE CLINIC | Facility: CLINIC | Age: 56
End: 2021-05-20

## 2021-05-21 ENCOUNTER — TELEPHONE (OUTPATIENT)
Dept: FAMILY MEDICINE CLINIC | Facility: CLINIC | Age: 56
End: 2021-05-21

## 2021-05-21 DIAGNOSIS — K21.9 GASTROESOPHAGEAL REFLUX DISEASE, UNSPECIFIED WHETHER ESOPHAGITIS PRESENT: Primary | ICD-10-CM

## 2021-05-21 RX ORDER — PANTOPRAZOLE SODIUM 40 MG/1
40 TABLET, DELAYED RELEASE ORAL DAILY
Qty: 30 TABLET | Refills: 3 | Status: SHIPPED | OUTPATIENT
Start: 2021-05-21

## 2021-05-21 RX ORDER — PANTOPRAZOLE SODIUM 40 MG/1
TABLET, DELAYED RELEASE ORAL
Status: CANCELLED | OUTPATIENT
Start: 2021-05-21

## 2021-06-23 DIAGNOSIS — M54.12 CERVICAL RADICULOPATHY: ICD-10-CM

## 2021-06-23 DIAGNOSIS — M62.838 TRAPEZIUS MUSCLE SPASM: ICD-10-CM

## 2021-06-23 RX ORDER — CYCLOBENZAPRINE HCL 10 MG
10 TABLET ORAL NIGHTLY PRN
Qty: 30 TABLET | Refills: 0 | Status: SHIPPED | OUTPATIENT
Start: 2021-06-23 | End: 2023-01-18 | Stop reason: SDUPTHER

## 2021-11-29 RX ORDER — IBUPROFEN 800 MG/1
TABLET ORAL
Qty: 90 TABLET | Refills: 3 | Status: SHIPPED | OUTPATIENT
Start: 2021-11-29 | End: 2022-03-28

## 2021-11-29 RX ORDER — COLCHICINE 0.6 MG/1
TABLET ORAL
Qty: 180 TABLET | Refills: 4 | Status: SHIPPED | OUTPATIENT
Start: 2021-11-29 | End: 2023-01-18 | Stop reason: SDUPTHER

## 2022-02-27 RX ORDER — LORATADINE 10 MG/1
TABLET ORAL
Qty: 90 TABLET | Refills: 3 | Status: SHIPPED | OUTPATIENT
Start: 2022-02-27 | End: 2023-01-18 | Stop reason: SDUPTHER

## 2022-03-28 RX ORDER — IBUPROFEN 800 MG/1
TABLET ORAL
Qty: 90 TABLET | Refills: 0 | Status: SHIPPED | OUTPATIENT
Start: 2022-03-28 | End: 2022-04-28

## 2022-04-28 RX ORDER — IBUPROFEN 800 MG/1
TABLET ORAL
Qty: 90 TABLET | Refills: 0 | Status: SHIPPED | OUTPATIENT
Start: 2022-04-28 | End: 2023-01-18 | Stop reason: SDUPTHER

## 2022-05-06 ENCOUNTER — OFFICE VISIT (OUTPATIENT)
Dept: FAMILY MEDICINE CLINIC | Facility: CLINIC | Age: 57
End: 2022-05-06

## 2022-05-06 VITALS
DIASTOLIC BLOOD PRESSURE: 76 MMHG | BODY MASS INDEX: 38.36 KG/M2 | OXYGEN SATURATION: 98 % | HEART RATE: 63 BPM | WEIGHT: 315 LBS | SYSTOLIC BLOOD PRESSURE: 134 MMHG | HEIGHT: 76 IN | TEMPERATURE: 98.1 F | RESPIRATION RATE: 20 BRPM

## 2022-05-06 DIAGNOSIS — J01.00 ACUTE NON-RECURRENT MAXILLARY SINUSITIS: ICD-10-CM

## 2022-05-06 DIAGNOSIS — J30.1 SEASONAL ALLERGIC RHINITIS DUE TO POLLEN: Primary | ICD-10-CM

## 2022-05-06 DIAGNOSIS — T14.8XXA ABRASION: ICD-10-CM

## 2022-05-06 DIAGNOSIS — E66.01 MORBID OBESITY: ICD-10-CM

## 2022-05-06 PROCEDURE — 99213 OFFICE O/P EST LOW 20 MIN: CPT | Performed by: FAMILY MEDICINE

## 2022-05-06 RX ORDER — CEPHALEXIN 500 MG/1
500 CAPSULE ORAL 3 TIMES DAILY
Qty: 30 CAPSULE | Refills: 0 | Status: SHIPPED | OUTPATIENT
Start: 2022-05-06 | End: 2022-12-05

## 2022-05-06 RX ORDER — FLUTICASONE PROPIONATE 50 MCG
2 SPRAY, SUSPENSION (ML) NASAL DAILY
Qty: 16 G | Refills: 12 | Status: SHIPPED | OUTPATIENT
Start: 2022-05-06 | End: 2022-06-27

## 2022-05-06 NOTE — PROGRESS NOTES
Subjective   Alexander Wayne is a 57 y.o. male. Presents to [facility]    Chief Complaint   Patient presents with   • Sinus Problem       Sinus Problem  This is a new problem. The current episode started in the past 7 days. The problem has been gradually worsening since onset. There has been no fever. He is experiencing no pain. Associated symptoms include chills, congestion (yellow, green), coughing (yellow, green), headaches, sneezing and a sore throat. Pertinent negatives include no ear pain, shortness of breath or sinus pressure. Past treatments include saline sprays (Nyquil, Ibuprofen, Mucous relief). The treatment provided mild relief.        I personally reviewed and updated the patient's allergies, medications, problem list, and past medical, surgical, social, and family history. I have reviewed and confirmed the accuracy of the History of Present Illness and Review of Symptoms as documented by the MA/LPN/RN. Melissa Waldron MD    Allergies:  Allergies   Allergen Reactions   • Ciprofloxacin Hives       Social History:  Social History     Socioeconomic History   • Marital status:    Tobacco Use   • Smoking status: Former Smoker     Quit date: 10/21/2015     Years since quittin.5   • Smokeless tobacco: Former User   Substance and Sexual Activity   • Alcohol use: Yes     Comment: SOCIAL   • Drug use: No       Family History:  Family History   Problem Relation Age of Onset   • Colon cancer Mother    • Breast cancer Mother    • Hypertension Father    • Diabetes Maternal Grandmother    • Lung cancer Maternal Grandfather    • Liver cancer Maternal Grandfather        Past Medical History :  Patient Active Problem List   Diagnosis   • Obstructive sleep apnea   • AK (actinic keratosis)   • Bilateral hearing loss   • Edema, lower extremity   • History of chicken pox   • Idiopathic chronic gout of multiple sites without tophus   • Inguinal hernia   • Morbid obesity (HCC)   • History of  tobacco use   • Pain, joint, knee, right   • Peptic ulcer   • Post-nasal drip   • Screening for prostate cancer   • Seasonal allergic rhinitis due to pollen   • Thrombocytopenia (HCC)   • Umbilical hernia   • Left elbow pain   • Lateral epicondylitis (tennis elbow)   • Right leg pain   • Chronic venous hypertension with inflammation   • Trapezius muscle spasm   • Neck pain   • Cervical radiculopathy   • Acute non-recurrent frontal sinusitis       Medication List:    Current Outpatient Medications:   •  colchicine 0.6 MG tablet, TAKE 1 TABLET BY MOUTH TWICE A DAY AS NEEDED FOR MUSCLE/JOINT PAIN, Disp: 180 tablet, Rfl: 4  •  cyclobenzaprine (FLEXERIL) 10 MG tablet, TAKE 1 TABLET BY MOUTH AT NIGHT AS NEEDED FOR MUSCLE SPASMS., Disp: 30 tablet, Rfl: 0  •  ibuprofen (ADVIL,MOTRIN) 800 MG tablet, TAKE 1 TABLET BY MOUTH THREE TIMES A DAY AS NEEDED, Disp: 90 tablet, Rfl: 0  •  loratadine (CLARITIN) 10 MG tablet, TAKE 1 TABLET BY MOUTH EVERY DAY, Disp: 90 tablet, Rfl: 3  •  mupirocin (Bactroban) 2 % ointment, Apply  topically to the appropriate area as directed 3 (Three) Times a Day., Disp: 1 each, Rfl: 0  •  cephalexin (KEFLEX) 500 MG capsule, Take 1 capsule by mouth 3 (Three) Times a Day., Disp: 30 capsule, Rfl: 0  •  fluticasone (FLONASE) 50 MCG/ACT nasal spray, 2 sprays into the nostril(s) as directed by provider Daily., Disp: 16 g, Rfl: 12  •  pantoprazole (PROTONIX) 40 MG EC tablet, Take 1 tablet by mouth Daily., Disp: 30 tablet, Rfl: 3    Past Surgical History:  Past Surgical History:   Procedure Laterality Date   • INGUINAL HERNIA REPAIR     • KNEE LIGAMENT RECONSTRUCTION     • LAMINECTOMY      3    • LUMBAR DISCECTOMY         Review of Systems:  Review of Systems   Constitutional: Positive for chills. Negative for activity change and fever.   HENT: Positive for congestion (yellow, green), sneezing and sore throat. Negative for ear pain, rhinorrhea, sinus pressure and voice change.    Eyes: Negative for visual  "disturbance.   Respiratory: Positive for cough (yellow, green). Negative for shortness of breath.    Cardiovascular: Negative for chest pain.   Gastrointestinal: Negative for abdominal pain, diarrhea, nausea and vomiting.   Endocrine: Negative for cold intolerance and heat intolerance.   Genitourinary: Negative for frequency and urgency.   Musculoskeletal: Negative for arthralgias.   Skin: Negative for rash.   Neurological: Negative for syncope.   Hematological: Does not bruise/bleed easily.   Psychiatric/Behavioral: Negative for depressed mood. The patient is not nervous/anxious.        Physical Exam:  Vital Signs:  Vital Signs:   /76 (BP Location: Right arm, Patient Position: Sitting, Cuff Size: Large Adult)   Pulse 63   Temp 98.1 °F (36.7 °C) (Skin)   Resp 20   Ht 193 cm (76\")   Wt (!) 171 kg (376 lb 6.4 oz)   SpO2 98%   BMI 45.82 kg/m²     Result Review :                Physical Exam  Vitals reviewed.   Constitutional:       Appearance: Normal appearance. He is well-developed.   HENT:      Head: Normocephalic and atraumatic.      Right Ear: Tympanic membrane and external ear normal. No decreased hearing noted. No tenderness. No middle ear effusion. Tympanic membrane is not injected, erythematous, retracted or bulging.      Left Ear: Tympanic membrane and external ear normal. No decreased hearing noted. No tenderness.  No middle ear effusion. Tympanic membrane is not injected, erythematous, retracted or bulging.      Nose: No rhinorrhea.      Right Sinus: Frontal sinus tenderness present. No maxillary sinus tenderness.      Left Sinus: Frontal sinus tenderness present. No maxillary sinus tenderness.      Mouth/Throat:      Pharynx: No oropharyngeal exudate or posterior oropharyngeal erythema.   Eyes:      General:         Right eye: No discharge.         Left eye: No discharge.   Cardiovascular:      Rate and Rhythm: Normal rate and regular rhythm.      Heart sounds: Normal heart sounds. No murmur " heard.    No friction rub. No gallop.   Pulmonary:      Effort: Pulmonary effort is normal. No respiratory distress.      Breath sounds: Normal breath sounds. No wheezing or rales.   Lymphadenopathy:      Cervical: No cervical adenopathy.   Skin:     General: Skin is warm and dry.      Findings: No rash.   Neurological:      Mental Status: He is alert and oriented to person, place, and time.      Coordination: Coordination normal.      Gait: Gait normal.   Psychiatric:         Behavior: Behavior is cooperative.         Assessment and Plan:  Problems Addressed this Visit        Allergies and Adverse Reactions    Seasonal allergic rhinitis due to pollen - Primary     Contributing  Diagnosis, treatment and and course discussed. Potential side effects discussed. Return if there is worsening or persistence of symptoms.              Relevant Medications    fluticasone (FLONASE) 50 MCG/ACT nasal spray       ENT    Acute non-recurrent frontal sinusitis     increase fluids, tylenol for fever, motrin for pain. Humidifier to help with congestion and to sleep at night. Dicussed OTC meds, gargle with warm salt water. If there is recurrent fever, shortness of breath, lethargy, advised to come in to the office or go to the ER.             Relevant Medications    cephalexin (KEFLEX) 500 MG capsule       Endocrine and Metabolic    Morbid obesity (HCC)      Other Visit Diagnoses     Abrasion        none currently.    Relevant Medications    mupirocin (Bactroban) 2 % ointment      Diagnoses       Codes Comments    Seasonal allergic rhinitis due to pollen    -  Primary ICD-10-CM: J30.1  ICD-9-CM: 477.0     Abrasion     ICD-10-CM: T14.8XXA  ICD-9-CM: 919.0 none currently.    Acute non-recurrent maxillary sinusitis     ICD-10-CM: J01.00  ICD-9-CM: 461.0     Morbid obesity (HCC)     ICD-10-CM: E66.01  ICD-9-CM: 278.01            Class 3 Severe Obesity (BMI >=40). Obesity-related health conditions include the following: hypertension.  Obesity is worsening. BMI is is above average; BMI management plan is completed. We discussed low calorie, low carb based diet program, portion control and increasing exercise.      An After Visit Summary and PPPS were given to the patient.       I wore protective equipment throughout this patient encounter to include mask. Hand hygiene was performed before donning protective equipment and after removal when leaving the room.

## 2022-05-11 PROBLEM — J01.10 ACUTE NON-RECURRENT FRONTAL SINUSITIS: Status: ACTIVE | Noted: 2022-05-06

## 2022-05-11 NOTE — ASSESSMENT & PLAN NOTE
Contributing  Diagnosis, treatment and and course discussed. Potential side effects discussed. Return if there is worsening or persistence of symptoms.

## 2022-06-27 DIAGNOSIS — J30.1 SEASONAL ALLERGIC RHINITIS DUE TO POLLEN: ICD-10-CM

## 2022-06-27 RX ORDER — FLUTICASONE PROPIONATE 50 MCG
SPRAY, SUSPENSION (ML) NASAL
Qty: 16 ML | Refills: 12 | Status: SHIPPED | OUTPATIENT
Start: 2022-06-27 | End: 2022-07-24

## 2022-07-24 DIAGNOSIS — J30.1 SEASONAL ALLERGIC RHINITIS DUE TO POLLEN: ICD-10-CM

## 2022-07-24 RX ORDER — FLUTICASONE PROPIONATE 50 MCG
SPRAY, SUSPENSION (ML) NASAL
Qty: 16 ML | Refills: 12 | Status: SHIPPED | OUTPATIENT
Start: 2022-07-24 | End: 2022-12-05

## 2022-12-05 ENCOUNTER — OFFICE VISIT (OUTPATIENT)
Dept: FAMILY MEDICINE CLINIC | Facility: CLINIC | Age: 57
End: 2022-12-05

## 2022-12-05 VITALS
OXYGEN SATURATION: 98 % | TEMPERATURE: 97.7 F | WEIGHT: 315 LBS | DIASTOLIC BLOOD PRESSURE: 90 MMHG | HEART RATE: 73 BPM | HEIGHT: 76 IN | SYSTOLIC BLOOD PRESSURE: 148 MMHG | BODY MASS INDEX: 38.36 KG/M2 | RESPIRATION RATE: 18 BRPM

## 2022-12-05 DIAGNOSIS — Z87.891 HISTORY OF TOBACCO USE: ICD-10-CM

## 2022-12-05 DIAGNOSIS — H61.23 BILATERAL IMPACTED CERUMEN: ICD-10-CM

## 2022-12-05 DIAGNOSIS — E66.01 MORBID OBESITY: ICD-10-CM

## 2022-12-05 DIAGNOSIS — H93.8X3 SENSATION OF FULLNESS IN BOTH EARS: Primary | ICD-10-CM

## 2022-12-05 DIAGNOSIS — Z23 NEED FOR INFLUENZA VACCINATION: ICD-10-CM

## 2022-12-05 PROCEDURE — 69210 REMOVE IMPACTED EAR WAX UNI: CPT | Performed by: FAMILY MEDICINE

## 2022-12-05 PROCEDURE — 90686 IIV4 VACC NO PRSV 0.5 ML IM: CPT | Performed by: FAMILY MEDICINE

## 2022-12-05 PROCEDURE — 90471 IMMUNIZATION ADMIN: CPT | Performed by: FAMILY MEDICINE

## 2022-12-05 NOTE — PROGRESS NOTES
Subjective   Alexander Wayne is a 57 y.o. male. Presents to [facility] MEDICAL [facility]    Chief Complaint   Patient presents with   • Ear Fullness       Ear Fullness   There is pain in both ears. This is a recurrent problem. The current episode started more than 1 month ago. The problem occurs constantly. The problem has been unchanged. There has been no fever. The pain is mild. Associated symptoms include ear discharge (dark brown/red). Pertinent negatives include no coughing, diarrhea, hearing loss, rhinorrhea or sore throat. Treatments tried: hot water and baking soda. The treatment provided no relief.        I personally reviewed and updated the patient's allergies, medications, problem list, and past medical, surgical, social, and family history. I have reviewed and confirmed the accuracy of the History of Present Illness and Review of Symptoms as documented by the MA/LPN/RN. Melissa Waldron MD    Allergies:  Allergies   Allergen Reactions   • Ciprofloxacin Hives       Social History:  Social History     Socioeconomic History   • Marital status:    Tobacco Use   • Smoking status: Former     Types: Cigarettes     Quit date: 10/21/2015     Years since quittin.1   • Smokeless tobacco: Former   Substance and Sexual Activity   • Alcohol use: Yes     Comment: SOCIAL   • Drug use: No       Family History:  Family History   Problem Relation Age of Onset   • Colon cancer Mother    • Breast cancer Mother    • Hypertension Father    • Diabetes Maternal Grandmother    • Lung cancer Maternal Grandfather    • Liver cancer Maternal Grandfather        Past Medical History :  Patient Active Problem List   Diagnosis   • Obstructive sleep apnea   • AK (actinic keratosis)   • Bilateral hearing loss   • Edema, lower extremity   • History of chicken pox   • Idiopathic chronic gout of multiple sites without tophus   • Inguinal hernia   • Morbid obesity (HCC)   • History of tobacco use   • Pain, joint, knee, right   •  Peptic ulcer   • Post-nasal drip   • Screening for prostate cancer   • Seasonal allergic rhinitis due to pollen   • Thrombocytopenia (HCC)   • Umbilical hernia   • Left elbow pain   • Lateral epicondylitis (tennis elbow)   • Right leg pain   • Chronic venous hypertension with inflammation   • Trapezius muscle spasm   • Neck pain   • Cervical radiculopathy   • Acute non-recurrent frontal sinusitis   • Sensation of fullness in both ears   • Bilateral impacted cerumen       Medication List:    Current Outpatient Medications:   •  colchicine 0.6 MG tablet, TAKE 1 TABLET BY MOUTH TWICE A DAY AS NEEDED FOR MUSCLE/JOINT PAIN, Disp: 180 tablet, Rfl: 4  •  cyclobenzaprine (FLEXERIL) 10 MG tablet, TAKE 1 TABLET BY MOUTH AT NIGHT AS NEEDED FOR MUSCLE SPASMS., Disp: 30 tablet, Rfl: 0  •  ibuprofen (ADVIL,MOTRIN) 800 MG tablet, TAKE 1 TABLET BY MOUTH THREE TIMES A DAY AS NEEDED, Disp: 90 tablet, Rfl: 0  •  loratadine (CLARITIN) 10 MG tablet, TAKE 1 TABLET BY MOUTH EVERY DAY, Disp: 90 tablet, Rfl: 3  •  mupirocin (Bactroban) 2 % ointment, Apply  topically to the appropriate area as directed 3 (Three) Times a Day., Disp: 1 each, Rfl: 0  •  pantoprazole (PROTONIX) 40 MG EC tablet, Take 1 tablet by mouth Daily., Disp: 30 tablet, Rfl: 3    Past Surgical History:  Past Surgical History:   Procedure Laterality Date   • INGUINAL HERNIA REPAIR     • KNEE LIGAMENT RECONSTRUCTION     • LAMINECTOMY      3    • LUMBAR DISCECTOMY           Physical Exam:      Vital Signs:    Vitals:    12/05/22 1614   BP: 148/90   Pulse: 73   Resp: 18   Temp: 97.7 °F (36.5 °C)   SpO2: 98%        Wt Readings from Last 3 Encounters:   12/05/22 (!) 168 kg (370 lb 6.4 oz)   05/06/22 (!) 171 kg (376 lb 6.4 oz)   05/17/21 (!) 168 kg (369 lb 9.6 oz)       Result Review :          Ear Cerumen Removal    Date/Time: 12/13/2022 8:16 PM  Performed by: Melissa Waldron MD  Authorized by: Melissa Waldron MD     Anesthesia:  Local Anesthetic: none  Ceruminolytics  applied: Ceruminolytics applied prior to the procedure.  Location details: left ear and right ear  Patient tolerance: patient tolerated the procedure well with no immediate complications  Procedure type: instrumentation, irrigation   Sedation:  Patient sedated: no             Physical Exam  Vitals reviewed.   Constitutional:       Appearance: Normal appearance. He is well-developed.   HENT:      Head: Normocephalic and atraumatic.      Right Ear: There is impacted cerumen.      Left Ear: There is impacted cerumen.   Eyes:      General:         Right eye: No discharge.         Left eye: No discharge.   Cardiovascular:      Rate and Rhythm: Normal rate and regular rhythm.      Heart sounds: Normal heart sounds. No murmur heard.    No friction rub. No gallop.   Pulmonary:      Effort: Pulmonary effort is normal. No respiratory distress.      Breath sounds: Normal breath sounds. No wheezing or rales.   Skin:     General: Skin is warm and dry.      Findings: No rash.   Neurological:      Mental Status: He is alert and oriented to person, place, and time.      Coordination: Coordination normal.      Gait: Gait normal.   Psychiatric:         Behavior: Behavior is cooperative.         Assessment and Plan:  Problems Addressed this Visit        ENT    Sensation of fullness in both ears - Primary    Bilateral impacted cerumen     Removed partially    No complications               Endocrine and Metabolic    Morbid obesity (HCC)     Patient's (Body mass index is 45.09 kg/m².) indicates that they are obese (BMI >30) with health conditions that include none . Weight is improving with lifestyle modifications. BMI is is above average; BMI management plan is completed. We discussed portion control and increasing exercise.             Tobacco    History of tobacco use   Other Visit Diagnoses     Need for influenza vaccination          Diagnoses       Codes Comments    Sensation of fullness in both ears    -  Primary ICD-10-CM:  H93.8X3  ICD-9-CM: 388.8     History of tobacco use     ICD-10-CM: Z87.891  ICD-9-CM: V15.82     Morbid obesity (HCC)     ICD-10-CM: E66.01  ICD-9-CM: 278.01     Need for influenza vaccination     ICD-10-CM: Z23  ICD-9-CM: V04.81     Bilateral impacted cerumen     ICD-10-CM: H61.23  ICD-9-CM: 380.4            Class 3 Severe Obesity (BMI >=40). Obesity-related health conditions include the following: none. Obesity is improving with lifestyle modifications. BMI is is above average; BMI management plan is completed. We discussed low calorie, low carb based diet program, portion control and increasing exercise.      An After Visit Summary and PPPS were given to the patient.       I wore protective equipment throughout this patient encounter to include mask and eyewear. Hand hygiene was performed before donning protective equipment and after removal when leaving the room.

## 2022-12-05 NOTE — ASSESSMENT & PLAN NOTE
Patient's (Body mass index is 45.09 kg/m².) indicates that they are obese (BMI >30) with health conditions that include none . Weight is improving with lifestyle modifications. BMI is is above average; BMI management plan is completed. We discussed portion control and increasing exercise.

## 2022-12-15 ENCOUNTER — CLINICAL SUPPORT (OUTPATIENT)
Dept: FAMILY MEDICINE CLINIC | Facility: CLINIC | Age: 57
End: 2022-12-15

## 2022-12-15 DIAGNOSIS — H61.21 RIGHT EAR IMPACTED CERUMEN: ICD-10-CM

## 2022-12-15 DIAGNOSIS — J01.00 ACUTE NON-RECURRENT MAXILLARY SINUSITIS: Primary | ICD-10-CM

## 2022-12-15 RX ORDER — CEPHALEXIN 500 MG/1
500 CAPSULE ORAL 3 TIMES DAILY
Qty: 30 CAPSULE | Refills: 0 | Status: SHIPPED | OUTPATIENT
Start: 2022-12-15 | End: 2023-03-17

## 2022-12-15 NOTE — PROGRESS NOTES
Cerumen removed by instrument in the right ear. No complication. He complains of sinusits   Medication sent in  If worsens, he is to return or go to the urgent care/ER    Ear Cerumen Removal    Date/Time: 1/6/2023 5:22 PM  Performed by: Melissa Waldron MD  Authorized by: Melissa Waldron MD     Anesthesia:  Local Anesthetic: none  Ceruminolytics applied: Ceruminolytics applied prior to the procedure.  Location details: right ear  Patient tolerance: patient tolerated the procedure well with no immediate complications  Procedure type: instrumentation, ear spatula   Sedation:  Patient sedated: no

## 2023-01-06 PROCEDURE — 69210 REMOVE IMPACTED EAR WAX UNI: CPT | Performed by: FAMILY MEDICINE

## 2023-01-18 DIAGNOSIS — M54.12 CERVICAL RADICULOPATHY: ICD-10-CM

## 2023-01-18 DIAGNOSIS — T14.8XXA ABRASION: ICD-10-CM

## 2023-01-18 DIAGNOSIS — M62.838 TRAPEZIUS MUSCLE SPASM: ICD-10-CM

## 2023-01-18 RX ORDER — CYCLOBENZAPRINE HCL 10 MG
10 TABLET ORAL NIGHTLY PRN
Qty: 30 TABLET | Refills: 0 | Status: SHIPPED | OUTPATIENT
Start: 2023-01-18

## 2023-01-18 RX ORDER — COLCHICINE 0.6 MG/1
0.6 TABLET ORAL 2 TIMES DAILY PRN
Qty: 180 TABLET | Refills: 0 | Status: SHIPPED | OUTPATIENT
Start: 2023-01-18

## 2023-01-18 RX ORDER — IBUPROFEN 800 MG/1
800 TABLET ORAL 3 TIMES DAILY PRN
Qty: 90 TABLET | Refills: 0 | Status: SHIPPED | OUTPATIENT
Start: 2023-01-18

## 2023-01-18 RX ORDER — LORATADINE 10 MG/1
10 TABLET ORAL DAILY
Qty: 90 TABLET | Refills: 0 | Status: SHIPPED | OUTPATIENT
Start: 2023-01-18

## 2023-01-18 NOTE — TELEPHONE ENCOUNTER
Caller: Alexander Wayne    Relationship: Self    Best call back number: [phone]    Requested Prescriptions:   Requested Prescriptions     Pending Prescriptions Disp Refills   • ibuprofen (ADVIL,MOTRIN) 800 MG tablet 90 tablet 0     Sig: Take 1 tablet by mouth 3 (Three) Times a Day As Needed.   • colchicine 0.6 MG tablet 180 tablet 4   • loratadine (CLARITIN) 10 MG tablet 90 tablet 3     Sig: Take 1 tablet by mouth Daily.   • cyclobenzaprine (FLEXERIL) 10 MG tablet 30 tablet 0     Sig: Take 1 tablet by mouth At Night As Needed for Muscle Spasms.   • mupirocin (Bactroban) 2 % ointment 1 each 0     Sig: Apply  topically to the appropriate area as directed 3 (Three) Times a Day.        Pharmacy where request should be sent: [id number] [place] [place] [facility] [address] [phone] [phone] [facility]     Does the patient have less than a 3 day supply:  [x] Yes  [] No    Would you like a call back once the refill request has been completed: [] Yes [x] No    Sammy Blankenship Rep   01/18/23 16:18 EST

## 2023-01-19 NOTE — TELEPHONE ENCOUNTER
Called pt changed his appt to a wellness in March. Ordered labs he said he is going to get them done soon

## 2023-03-15 NOTE — PROGRESS NOTES
"  Chief Complaint   Patient presents with   • Annual Exam   • Ear Fullness       Subjective   Alexander Wayne is a 58 y.o. male here for a Annual Visit.     Last Physical Exam: 10/2020 Previous physical was performed by  Melissa Waldron MD  General Health:good  Nutrition:in general, a \"healthy\" diet    Exercise Level:irregularly  Sleep:poorly  Hours of Sleep:5  Libido:fair  Self Skin Exam: occasionally  Monthly Testicular Self Exam: Performs monthly self testicular exam    Colonoscopy:   Last Completed Colonoscopy     This patient has no relevant Health Maintenance data.       per patient last colonoscopy was at age 47 in [place] . Declined.    PSA:   Lab Results   Component Value Date    PSA 1.3 2023    PSA 0.7 08/10/2018    PSA 0.6 2016           I personally reviewed and updated the patient's allergies, medications, problem list, and past medical, surgical, social, and family history.     Allergies:  Allergies   Allergen Reactions   • Ciprofloxacin Hives       Social History:  Social History     Socioeconomic History   • Marital status:    Tobacco Use   • Smoking status: Former     Types: Cigarettes     Quit date: 10/21/2015     Years since quittin.4   • Smokeless tobacco: Former   Substance and Sexual Activity   • Alcohol use: Yes     Comment: SOCIAL   • Drug use: No       Family History:  Family History   Problem Relation Age of Onset   • Colon cancer Mother    • Breast cancer Mother    • Hypertension Father    • Diabetes Maternal Grandmother    • Lung cancer Maternal Grandfather    • Liver cancer Maternal Grandfather        Past Medical History :  Active Ambulatory Problems     Diagnosis Date Noted   • Obstructive sleep apnea 2016   • AK (actinic keratosis) 10/20/2020   • Bilateral hearing loss 10/20/2020   • Edema, lower extremity 10/20/2020   • History of chicken pox 10/20/2020   • Idiopathic chronic gout of multiple sites without tophus 10/20/2020   • Inguinal hernia " 10/20/2020   • Class 3 severe obesity due to excess calories with serious comorbidity and body mass index (BMI) of 45.0 to 49.9 in adult ([facility]) 10/20/2020   • History of tobacco use 10/20/2020   • Pain, joint, knee, right 10/20/2020   • Peptic ulcer 10/20/2020   • Post-nasal drip 10/20/2020   • Screening for prostate cancer 10/20/2020   • Seasonal allergic rhinitis due to pollen 10/20/2020   • Thrombocytopenia (HCC) 10/20/2020   • Umbilical hernia 10/20/2020   • Left elbow pain 10/20/2020   • Lateral epicondylitis (tennis elbow) 10/20/2020   • Right leg pain 12/01/2020   • Chronic venous hypertension with inflammation 02/12/2021   • Trapezius muscle spasm 05/17/2021   • Neck pain 05/17/2021   • Cervical radiculopathy 05/17/2021   • Acute non-recurrent frontal sinusitis 05/06/2022   • Sensation of fullness in both ears 12/05/2022   • Bilateral impacted cerumen 12/05/2022     Resolved Ambulatory Problems     Diagnosis Date Noted   • Cellulitis of right lower extremity 10/20/2020   • RUQ pain 10/20/2020     Past Medical History:   Diagnosis Date   • Chronic idiopathic gout of multiple sites without tophus    • Seasonal allergic rhinitis    • Sleep apnea        Medication List:    Current Outpatient Medications:   •  colchicine 0.6 MG tablet, Take 1 tablet by mouth 2 (Two) Times a Day As Needed for Muscle / Joint Pain., Disp: 180 tablet, Rfl: 0  •  cyclobenzaprine (FLEXERIL) 10 MG tablet, Take 1 tablet by mouth At Night As Needed for Muscle Spasms., Disp: 30 tablet, Rfl: 0  •  ibuprofen (ADVIL,MOTRIN) 800 MG tablet, Take 1 tablet by mouth 3 (Three) Times a Day As Needed for Moderate Pain., Disp: 90 tablet, Rfl: 0  •  loratadine (CLARITIN) 10 MG tablet, Take 1 tablet by mouth Daily., Disp: 90 tablet, Rfl: 0  •  meloxicam (MOBIC) 15 MG tablet, Take 1 tablet by mouth Daily. with food, Disp: , Rfl:   •  mupirocin (Bactroban) 2 % ointment, Apply  topically to the appropriate area as directed 3 (Three) Times a Day., Disp: 1  "each, Rfl: 0  •  pantoprazole (PROTONIX) 40 MG EC tablet, Take 1 tablet by mouth Daily., Disp: 30 tablet, Rfl: 3    Past Surgical History:  Past Surgical History:   Procedure Laterality Date   • INGUINAL HERNIA REPAIR     • KNEE LIGAMENT RECONSTRUCTION     • LAMINECTOMY      3    • LUMBAR DISCECTOMY         Depression Screen:   PHQ-2/PHQ-9 Depression Screening 12/5/2022   Retired Total Score -   Little Interest or Pleasure in Doing Things 0-->not at all   Feeling Down, Depressed or Hopeless 0-->not at all   PHQ-9: Brief Depression Severity Measure Score 0       Fall Risk Screen:  HAILE Fall Risk Assessment has not been completed.      Physical Exam:  Vital Signs:  Visit Vitals  /82 (BP Location: Right arm, Patient Position: Sitting, Cuff Size: Adult)   Pulse 74   Temp 97.3 °F (36.3 °C) (Temporal)   Resp 19   Ht 193 cm (76\")   Wt (!) 169 kg (373 lb 6.4 oz)   SpO2 98% Comment: room air   BMI 45.45 kg/m²       Result Review :                  Assessment and Plan:  Problem List Items Addressed This Visit        ENT    Bilateral impacted cerumen    Current Assessment & Plan     resolved            Endocrine and Metabolic    Class 3 severe obesity due to excess calories with serious comorbidity and body mass index (BMI) of 45.0 to 49.9 in adult (HCC)    Current Assessment & Plan     Patient's (Body mass index is 45.45 kg/m².) indicates that they are obese (BMI >30) with health conditions that include none . Weight is improving with lifestyle modifications. BMI  is above average; BMI management plan is completed. We discussed portion control and increasing exercise.             Genitourinary and Reproductive     Screening for prostate cancer    Overview     Discussed injury prevention, diet and exercise, safe sexual practices, and screening for common diseases. Encouraged use of sunscreen and seatbelts. Avoidance of tobacco encouraged. Limitation or avoidance of alcohol encouraged. Recommend yearly dental and eye " exams. Also discussed monitoring of blood pressure, lipids.  He has had colonoscopy (at age 47)    he has had his prostate looked at         Relevant Orders    PSA Screen (Completed)       Neuro    Cervical radiculopathy    Relevant Orders    Comprehensive Metabolic Panel (Completed)    Uric acid (Completed)   Other Visit Diagnoses     Encounter for wellness examination    -  Primary    Relevant Orders    CBC & Differential (Completed)    TSH (Completed)    POCT urinalysis dipstick, multipro (Completed)    Screening for hyperlipidemia        Relevant Orders    Comprehensive Metabolic Panel (Completed)    Lipid Panel With / Chol / HDL Ratio (Completed)    Need for hepatitis C screening test        Relevant Orders    Hepatitis C RNA, Quantitative, PCR (graph) (Completed)          Class 3 Severe Obesity (BMI >=40). Obesity-related health conditions include the following: hypertension. Obesity is worsening. BMI is is above average; BMI management plan is completed. We discussed low calorie, low carb based diet program, portion control and increasing exercise.      An After Visit Summary and PPPS were given to the patient.       Discussed injury prevention, diet and exercise, safe sexual practices, and screening for common diseases. Encouraged use of sunscreen and seatbelts. Discussed timing of screenings. Encouraged monthly testicular exams, yearly physical exams. Avoidance of tobacco encouraged. Limitation or avoidance of alcohol encouraged. Recommend yearly dental and eye exams. Also discussed monitoring of blood pressure, lipids.     I wore protective equipment throughout this patient encounter to include mask. Hand hygiene was performed before donning protective equipment and after removal when leaving the room.    +++++E/M portion medically necessary secondary to new or uncontrolled chronic problem+++++++    Subjective   Alexander Wayne is here for:    Chief Complaint   Patient presents with   • Annual Exam   • Ear  Fullness       Ear Fullness   There is pain in both ears. This is a recurrent problem. The current episode started 1 to 4 weeks ago. The problem occurs constantly. The problem has been gradually worsening. There has been no fever. Pertinent negatives include no coughing, diarrhea, ear discharge (red- gone now), headaches, hearing loss or sore throat. He has tried nothing for the symptoms. The treatment provided no relief.         Physical Exam:  Review of Systems   HENT: Negative for ear discharge (red- gone now), hearing loss and sore throat.    Respiratory: Negative for cough.    Gastrointestinal: Negative for diarrhea.        Physical Exam  Vitals reviewed.   Constitutional:       General: He is not in acute distress.     Appearance: He is well-developed. He is not diaphoretic.   HENT:      Head: Normocephalic and atraumatic.      Right Ear: Tympanic membrane and external ear normal.      Left Ear: Tympanic membrane and external ear normal.      Nose: Nose normal.      Mouth/Throat:      Pharynx: No oropharyngeal exudate.   Eyes:      General: No scleral icterus.        Right eye: No discharge.         Left eye: No discharge.      Conjunctiva/sclera: Conjunctivae normal.      Pupils: Pupils are equal, round, and reactive to light.   Neck:      Thyroid: No thyromegaly.      Trachea: No tracheal deviation.   Cardiovascular:      Rate and Rhythm: Normal rate and regular rhythm.      Heart sounds: Normal heart sounds. No murmur heard.    No friction rub. No gallop.   Pulmonary:      Effort: Pulmonary effort is normal. No respiratory distress.      Breath sounds: Normal breath sounds. No stridor. No wheezing or rales.   Abdominal:      General: Bowel sounds are normal. There is no distension.      Palpations: Abdomen is soft. There is no mass.      Tenderness: There is no abdominal tenderness. There is no guarding or rebound.      Hernia: There is no hernia in the left inguinal area or right inguinal area.    Genitourinary:     Penis: Normal. No tenderness or discharge.       Testes: Normal.         Right: Mass, tenderness or swelling not present.         Left: Mass, tenderness or swelling not present.   Musculoskeletal:         General: No tenderness or deformity. Normal range of motion.      Cervical back: Normal range of motion and neck supple.   Skin:     General: Skin is warm and dry.      Capillary Refill: Capillary refill takes less than 2 seconds.      Coloration: Skin is not pale.      Findings: No erythema or rash.   Neurological:      Mental Status: He is alert and oriented to person, place, and time. He is not disoriented.      Cranial Nerves: No cranial nerve deficit.      Sensory: No sensory deficit.      Motor: No tremor, atrophy or abnormal muscle tone.      Coordination: Coordination normal.      Gait: Gait normal.      Deep Tendon Reflexes: Reflexes are normal and symmetric. Reflexes normal.   Psychiatric:         Behavior: Behavior normal.         Thought Content: Thought content normal.         Judgment: Judgment normal.         Assessment and Plan:  Problem List Items Addressed This Visit        ENT    Bilateral impacted cerumen    Current Assessment & Plan     resolved            Endocrine and Metabolic    Class 3 severe obesity due to excess calories with serious comorbidity and body mass index (BMI) of 45.0 to 49.9 in adult (HCC)    Current Assessment & Plan     Patient's (Body mass index is 45.45 kg/m².) indicates that they are obese (BMI >30) with health conditions that include none . Weight is improving with lifestyle modifications. BMI  is above average; BMI management plan is completed. We discussed portion control and increasing exercise.             Genitourinary and Reproductive     Screening for prostate cancer    Overview     Discussed injury prevention, diet and exercise, safe sexual practices, and screening for common diseases. Encouraged use of sunscreen and seatbelts. Avoidance of  tobacco encouraged. Limitation or avoidance of alcohol encouraged. Recommend yearly dental and eye exams. Also discussed monitoring of blood pressure, lipids.  He has had colonoscopy (at age 47)    he has had his prostate looked at         Relevant Orders    PSA Screen (Completed)       Neuro    Cervical radiculopathy    Relevant Orders    Comprehensive Metabolic Panel (Completed)    Uric acid (Completed)   Other Visit Diagnoses     Encounter for wellness examination    -  Primary    Relevant Orders    CBC & Differential (Completed)    TSH (Completed)    POCT urinalysis dipstick, multipro (Completed)    Screening for hyperlipidemia        Relevant Orders    Comprehensive Metabolic Panel (Completed)    Lipid Panel With / Chol / HDL Ratio (Completed)    Need for hepatitis C screening test        Relevant Orders    Hepatitis C RNA, Quantitative, PCR (graph) (Completed)

## 2023-03-17 ENCOUNTER — OFFICE VISIT (OUTPATIENT)
Dept: FAMILY MEDICINE CLINIC | Facility: CLINIC | Age: 58
End: 2023-03-17
Payer: COMMERCIAL

## 2023-03-17 VITALS
HEIGHT: 76 IN | RESPIRATION RATE: 19 BRPM | BODY MASS INDEX: 38.36 KG/M2 | TEMPERATURE: 97.3 F | SYSTOLIC BLOOD PRESSURE: 132 MMHG | WEIGHT: 315 LBS | OXYGEN SATURATION: 98 % | HEART RATE: 74 BPM | DIASTOLIC BLOOD PRESSURE: 82 MMHG

## 2023-03-17 DIAGNOSIS — Z11.59 NEED FOR HEPATITIS C SCREENING TEST: ICD-10-CM

## 2023-03-17 DIAGNOSIS — Z13.220 SCREENING FOR HYPERLIPIDEMIA: ICD-10-CM

## 2023-03-17 DIAGNOSIS — H61.23 BILATERAL IMPACTED CERUMEN: ICD-10-CM

## 2023-03-17 DIAGNOSIS — Z00.00 ENCOUNTER FOR WELLNESS EXAMINATION: Primary | ICD-10-CM

## 2023-03-17 DIAGNOSIS — Z12.5 SCREENING FOR PROSTATE CANCER: ICD-10-CM

## 2023-03-17 DIAGNOSIS — M54.12 CERVICAL RADICULOPATHY: ICD-10-CM

## 2023-03-17 DIAGNOSIS — E66.01 CLASS 3 SEVERE OBESITY DUE TO EXCESS CALORIES WITH SERIOUS COMORBIDITY AND BODY MASS INDEX (BMI) OF 45.0 TO 49.9 IN ADULT: ICD-10-CM

## 2023-03-17 LAB
BILIRUB BLD-MCNC: NEGATIVE MG/DL
CLARITY, POC: CLEAR
COLOR UR: YELLOW
GLUCOSE UR STRIP-MCNC: NEGATIVE MG/DL
KETONES UR QL: NEGATIVE
LEUKOCYTE EST, POC: NEGATIVE
NITRITE UR-MCNC: NEGATIVE MG/ML
PH UR: 5 [PH] (ref 5–8)
PROT UR STRIP-MCNC: NEGATIVE MG/DL
RBC # UR STRIP: NEGATIVE /UL
SP GR UR: 1.01 (ref 1–1.03)
UROBILINOGEN UR QL: NORMAL

## 2023-03-17 PROCEDURE — 81003 URINALYSIS AUTO W/O SCOPE: CPT | Performed by: FAMILY MEDICINE

## 2023-03-17 PROCEDURE — 99396 PREV VISIT EST AGE 40-64: CPT | Performed by: FAMILY MEDICINE

## 2023-03-17 RX ORDER — MELOXICAM 15 MG/1
15 TABLET ORAL DAILY
COMMUNITY
Start: 2023-02-28

## 2023-03-17 NOTE — ASSESSMENT & PLAN NOTE
Patient's (Body mass index is 45.45 kg/m².) indicates that they are obese (BMI >30) with health conditions that include none . Weight is improving with lifestyle modifications. BMI  is above average; BMI management plan is completed. We discussed portion control and increasing exercise.

## 2023-03-21 LAB
ALBUMIN SERPL-MCNC: 4.5 G/DL (ref 3.8–4.9)
ALBUMIN/GLOB SERPL: 2.3 {RATIO} (ref 1.2–2.2)
ALP SERPL-CCNC: 76 IU/L (ref 44–121)
ALT SERPL-CCNC: 27 IU/L (ref 0–44)
AST SERPL-CCNC: 27 IU/L (ref 0–40)
BASOPHILS # BLD AUTO: 0 X10E3/UL (ref 0–0.2)
BASOPHILS NFR BLD AUTO: 1 %
BILIRUB SERPL-MCNC: 0.7 MG/DL (ref 0–1.2)
BUN SERPL-MCNC: 17 MG/DL (ref 6–24)
BUN/CREAT SERPL: 18 (ref 9–20)
CALCIUM SERPL-MCNC: 9.7 MG/DL (ref 8.7–10.2)
CHLORIDE SERPL-SCNC: 103 MMOL/L (ref 96–106)
CHOLEST SERPL-MCNC: 116 MG/DL (ref 100–199)
CHOLEST/HDLC SERPL: 2.2 RATIO (ref 0–5)
CO2 SERPL-SCNC: 25 MMOL/L (ref 20–29)
CREAT SERPL-MCNC: 0.92 MG/DL (ref 0.76–1.27)
EGFRCR SERPLBLD CKD-EPI 2021: 96 ML/MIN/1.73
EOSINOPHIL # BLD AUTO: 0.2 X10E3/UL (ref 0–0.4)
EOSINOPHIL NFR BLD AUTO: 3 %
ERYTHROCYTE [DISTWIDTH] IN BLOOD BY AUTOMATED COUNT: 13.1 % (ref 11.6–15.4)
GLOBULIN SER CALC-MCNC: 2 G/DL (ref 1.5–4.5)
GLUCOSE SERPL-MCNC: 100 MG/DL (ref 70–99)
HCT VFR BLD AUTO: 47.9 % (ref 37.5–51)
HCV RNA SERPL NAA+PROBE-ACNC: NORMAL IU/ML
HDLC SERPL-MCNC: 52 MG/DL
HGB BLD-MCNC: 16.2 G/DL (ref 13–17.7)
IMM GRANULOCYTES # BLD AUTO: 0 X10E3/UL (ref 0–0.1)
IMM GRANULOCYTES NFR BLD AUTO: 0 %
LDLC SERPL CALC-MCNC: 47 MG/DL (ref 0–99)
LYMPHOCYTES # BLD AUTO: 2 X10E3/UL (ref 0.7–3.1)
LYMPHOCYTES NFR BLD AUTO: 29 %
MCH RBC QN AUTO: 30.2 PG (ref 26.6–33)
MCHC RBC AUTO-ENTMCNC: 33.8 G/DL (ref 31.5–35.7)
MCV RBC AUTO: 89 FL (ref 79–97)
MONOCYTES # BLD AUTO: 0.7 X10E3/UL (ref 0.1–0.9)
MONOCYTES NFR BLD AUTO: 10 %
NEUTROPHILS # BLD AUTO: 4 X10E3/UL (ref 1.4–7)
NEUTROPHILS NFR BLD AUTO: 57 %
PLATELET # BLD AUTO: 136 X10E3/UL (ref 150–450)
POTASSIUM SERPL-SCNC: 4.1 MMOL/L (ref 3.5–5.2)
PROT SERPL-MCNC: 6.5 G/DL (ref 6–8.5)
PSA SERPL-MCNC: 1.3 NG/ML (ref 0–4)
RBC # BLD AUTO: 5.37 X10E6/UL (ref 4.14–5.8)
SODIUM SERPL-SCNC: 142 MMOL/L (ref 134–144)
TEST INFORMATION: NORMAL
TRIGL SERPL-MCNC: 88 MG/DL (ref 0–149)
TSH SERPL DL<=0.005 MIU/L-ACNC: 1.5 UIU/ML (ref 0.45–4.5)
URATE SERPL-MCNC: 7.8 MG/DL (ref 3.8–8.4)
VLDLC SERPL CALC-MCNC: 17 MG/DL (ref 5–40)
WBC # BLD AUTO: 6.9 X10E3/UL (ref 3.4–10.8)

## 2023-03-26 PROBLEM — E66.813 CLASS 3 SEVERE OBESITY DUE TO EXCESS CALORIES WITHOUT SERIOUS COMORBIDITY WITH BODY MASS INDEX (BMI) OF 45.0 TO 49.9 IN ADULT: Status: ACTIVE | Noted: 2020-10-20

## 2023-04-13 RX ORDER — COLCHICINE 0.6 MG/1
TABLET ORAL
Qty: 180 TABLET | Refills: 0 | Status: SHIPPED | OUTPATIENT
Start: 2023-04-13

## 2023-04-20 DIAGNOSIS — M62.838 TRAPEZIUS MUSCLE SPASM: ICD-10-CM

## 2023-04-20 DIAGNOSIS — M54.12 CERVICAL RADICULOPATHY: ICD-10-CM

## 2023-04-20 NOTE — TELEPHONE ENCOUNTER
"Caller: Alexander Wayne \"Farhad\"    Relationship: Self    Best call back number: [phone]    Requested Prescriptions:   Requested Prescriptions     Pending Prescriptions Disp Refills   • cyclobenzaprine (FLEXERIL) 10 MG tablet 30 tablet 0     Sig: Take 1 tablet by mouth At Night As Needed for Muscle Spasms.   • ibuprofen (ADVIL,MOTRIN) 800 MG tablet 90 tablet 0     Sig: Take 1 tablet by mouth 3 (Three) Times a Day As Needed for Moderate Pain.      Pharmacy where request should be sent: [id number] [place] [place] [facility] [address] [facility] [phone] [facility] [phone]      Last office visit with prescribing clinician: 3/17/2023   Last telemedicine visit with prescribing clinician: Visit date not found   Next office visit with prescribing clinician: Visit date not found     Does the patient have less than a 3 day supply:  [] Yes  [x] No    Would you like a call back once the refill request has been completed: [] Yes [x] No    If the office needs to give you a call back, can they leave a voicemail: [] Yes [] No    Sammy Tirado Rep   04/20/23 13:25 EDT       "

## 2023-04-24 RX ORDER — CYCLOBENZAPRINE HCL 10 MG
10 TABLET ORAL NIGHTLY PRN
Qty: 30 TABLET | Refills: 0 | Status: SHIPPED | OUTPATIENT
Start: 2023-04-24

## 2023-04-24 RX ORDER — IBUPROFEN 800 MG/1
800 TABLET ORAL 3 TIMES DAILY PRN
Qty: 90 TABLET | Refills: 0 | Status: SHIPPED | OUTPATIENT
Start: 2023-04-24

## 2023-04-25 RX ORDER — LORATADINE 10 MG/1
TABLET ORAL
Qty: 90 TABLET | Refills: 0 | Status: SHIPPED | OUTPATIENT
Start: 2023-04-25

## 2023-05-02 ENCOUNTER — TELEPHONE (OUTPATIENT)
Dept: FAMILY MEDICINE CLINIC | Facility: CLINIC | Age: 58
End: 2023-05-02
Payer: COMMERCIAL

## 2023-05-02 RX ORDER — FEXOFENADINE HCL 180 MG/1
180 TABLET ORAL DAILY
Qty: 30 TABLET | Refills: 12 | Status: SHIPPED | OUTPATIENT
Start: 2023-05-02

## 2023-05-02 NOTE — TELEPHONE ENCOUNTER
----- Message from Alexander Wayne sent at 5/2/2023  9:47 AM EDT -----  Regarding: [facility] prior authorization   Contact: [phone]  Not yet  Loratadine was the first allergy medicine I've taken.  Willing to try another

## 2023-05-11 ENCOUNTER — TELEPHONE (OUTPATIENT)
Dept: FAMILY MEDICINE CLINIC | Facility: CLINIC | Age: 58
End: 2023-05-11
Payer: COMMERCIAL

## 2023-05-11 RX ORDER — FEXOFENADINE HCL 180 MG/1
180 TABLET ORAL DAILY
Qty: 90 TABLET | Refills: 2 | Status: SHIPPED | OUTPATIENT
Start: 2023-05-11

## 2023-06-05 NOTE — TELEPHONE ENCOUNTER
Pt said he stopped taking meloxicam it was messing with his stomach. He asked for ibuprofen for be sent in he takes it for aches and pains as needed

## 2023-06-06 RX ORDER — IBUPROFEN 800 MG/1
TABLET ORAL
Qty: 90 TABLET | Refills: 2 | Status: SHIPPED | OUTPATIENT
Start: 2023-06-06

## 2023-07-28 RX ORDER — COLCHICINE 0.6 MG/1
TABLET ORAL
Qty: 180 TABLET | Refills: 0 | Status: SHIPPED | OUTPATIENT
Start: 2023-07-28

## 2023-09-01 RX ORDER — IBUPROFEN 800 MG/1
TABLET ORAL
Qty: 90 TABLET | Refills: 2 | Status: SHIPPED | OUTPATIENT
Start: 2023-09-01

## 2023-10-27 RX ORDER — COLCHICINE 0.6 MG/1
TABLET ORAL
Qty: 180 TABLET | Refills: 0 | Status: SHIPPED | OUTPATIENT
Start: 2023-10-27

## 2023-12-29 RX ORDER — IBUPROFEN 800 MG/1
TABLET ORAL
Qty: 90 TABLET | Refills: 2 | Status: SHIPPED | OUTPATIENT
Start: 2023-12-29

## 2024-09-10 ENCOUNTER — TELEPHONE (OUTPATIENT)
Dept: FAMILY MEDICINE CLINIC | Facility: CLINIC | Age: 59
End: 2024-09-10
Payer: COMMERCIAL

## 2024-09-10 NOTE — TELEPHONE ENCOUNTER
"Caller: Alexander Wayne \"Farhad\"    Relationship: Self    Best call back number: [phone]     FARHAD IS BEING SEEN ON 9/16 AND WOULD LIKE TO KNOW IF DR YANG IS OKAY WITH XRAY OF FINGER BEING FAXED OR WOULD SHE PREFER IMAGING ON A DISK    "

## 2024-09-16 ENCOUNTER — OFFICE VISIT (OUTPATIENT)
Dept: FAMILY MEDICINE CLINIC | Facility: CLINIC | Age: 59
End: 2024-09-16
Payer: COMMERCIAL

## 2024-09-16 VITALS
WEIGHT: 315 LBS | HEIGHT: 76 IN | BODY MASS INDEX: 38.36 KG/M2 | DIASTOLIC BLOOD PRESSURE: 80 MMHG | SYSTOLIC BLOOD PRESSURE: 118 MMHG | RESPIRATION RATE: 19 BRPM | HEART RATE: 71 BPM | TEMPERATURE: 97.3 F | OXYGEN SATURATION: 97 %

## 2024-09-16 DIAGNOSIS — M20.021 BOUTONNIERE DEFORMITY OF FINGER, RIGHT: ICD-10-CM

## 2024-09-16 DIAGNOSIS — S69.91XD INJURY OF FINGER OF RIGHT HAND, SUBSEQUENT ENCOUNTER: Primary | ICD-10-CM

## 2024-09-16 DIAGNOSIS — E66.01 CLASS 3 SEVERE OBESITY DUE TO EXCESS CALORIES WITH SERIOUS COMORBIDITY AND BODY MASS INDEX (BMI) OF 40.0 TO 44.9 IN ADULT: ICD-10-CM

## 2024-09-16 DIAGNOSIS — J32.8 OTHER CHRONIC SINUSITIS: ICD-10-CM

## 2024-09-16 DIAGNOSIS — T14.8XXA ABRASION: ICD-10-CM

## 2024-09-16 PROBLEM — J01.10 ACUTE NON-RECURRENT FRONTAL SINUSITIS: Status: RESOLVED | Noted: 2022-05-06 | Resolved: 2024-09-16

## 2024-09-16 RX ORDER — LORATADINE 10 MG/1
1 TABLET ORAL DAILY
COMMUNITY

## 2024-09-16 RX ORDER — MUPIROCIN 20 MG/G
OINTMENT TOPICAL 3 TIMES DAILY
Qty: 1 EACH | Refills: 0 | Status: SHIPPED | OUTPATIENT
Start: 2024-09-16

## 2024-09-16 RX ORDER — NEOMYCIN SULFATE, POLYMYXIN B SULFATE AND DEXAMETHASONE 3.5; 10000; 1 MG/ML; [USP'U]/ML; MG/ML
SUSPENSION/ DROPS OPHTHALMIC
COMMUNITY
Start: 2024-08-21

## 2024-10-15 NOTE — PROGRESS NOTES
Subjective   Alexander Wayne is a 59 y.o. male. Presents to [facility]    Chief Complaint   Patient presents with    Finger Injury       Finger Injury  This is a new problem. The current episode started more than 1 month ago (08/10/24). The problem occurs daily. The problem has been gradually improving. Pertinent negatives include no abdominal pain, fatigue or vomiting. Associated symptoms comments: Pain in improving . Exacerbated by: use of finger. He has tried nothing for the symptoms.      His 4th digit right hand is doing better. He is feeling better    I personally reviewed and updated the patient's allergies, medications, problem list, and past medical, surgical, social, and family history. I have reviewed and confirmed the accuracy of the History of Present Illness and Review of Symptoms as documented by the MA/LPN/RN. Melissa Waldron MD    Allergies:  Allergies   Allergen Reactions    Ciprofloxacin Hives       Social History:  Social History     Socioeconomic History    Marital status:    Tobacco Use    Smoking status: Former     Current packs/day: 0.00     Types: Cigarettes     Quit date: 10/21/2015     Years since quittin.0    Smokeless tobacco: Former   Substance and Sexual Activity    Alcohol use: Yes     Comment: SOCIAL    Drug use: No       Family History:  Family History   Problem Relation Age of Onset    Colon cancer Mother     Breast cancer Mother     Hypertension Father     Diabetes Maternal Grandmother     Lung cancer Maternal Grandfather     Liver cancer Maternal Grandfather        Past Medical History :  Patient Active Problem List   Diagnosis    Obstructive sleep apnea    AK (actinic keratosis)    Bilateral hearing loss    Edema, lower extremity    History of chicken pox    Idiopathic chronic gout of multiple sites without tophus    Inguinal hernia    Class 3 severe obesity due to excess calories with serious comorbidity and body mass index (BMI) of 40.0 to 44.9 in  "adult    History of tobacco use    Pain, joint, knee, right    Peptic ulcer    Post-nasal drip    Screening for prostate cancer    Seasonal allergic rhinitis due to pollen    Thrombocytopenia    Umbilical hernia    Left elbow pain    Lateral epicondylitis (tennis elbow)    Right leg pain    Chronic venous hypertension with inflammation    Trapezius muscle spasm    Neck pain    Cervical radiculopathy    Sensation of fullness in both ears    Bilateral impacted cerumen    Boutonniere deformity of finger, right    Other chronic sinusitis    Polyarthralgia       Medication List:    Current Outpatient Medications:     colchicine 0.6 MG tablet, TAKE 1 TABLET BY MOUTH 2 TIMES A DAY AS NEEDED FOR MUSCLE / JOINT PAIN., Disp: 180 tablet, Rfl: 0    cyclobenzaprine (FLEXERIL) 10 MG tablet, Take 1 tablet by mouth At Night As Needed for Muscle Spasms., Disp: 30 tablet, Rfl: 0    loratadine (CLARITIN) 10 MG tablet, Take 1 tablet by mouth Daily., Disp: , Rfl:     mupirocin (Bactroban) 2 % ointment, Apply  topically to the appropriate area as directed 3 (Three) Times a Day., Disp: 1 each, Rfl: 0    neomycin-polymyxin-dexamethasone (MAXITROL) 3.5-02477-2.1 ophthalmic suspension, INSTILL 1 DROP IN BOTH EYES THREE TIMES A DAY SHAKE BOTTLE, Disp: , Rfl:     celecoxib (CeleBREX) 200 MG capsule, Take 1 capsule by mouth 2 (Two) Times a Day., Disp: 60 capsule, Rfl: 12    Past Surgical History:  Past Surgical History:   Procedure Laterality Date    INGUINAL HERNIA REPAIR      KNEE LIGAMENT RECONSTRUCTION      LAMINECTOMY      3     LUMBAR DISCECTOMY           Physical Exam:      Vital Signs:    Vitals:    10/17/24 1537   BP: 150/90   Pulse: 72   Resp: 19   Temp: 97.1 °F (36.2 °C)   SpO2: 98%        /90 (BP Location: Right arm, Patient Position: Sitting, Cuff Size: Large Adult)   Pulse 72   Temp 97.1 °F (36.2 °C) (Temporal)   Resp 19   Ht 193 cm (76\")   Wt (!) 164 kg (362 lb 9.6 oz)   SpO2 98% Comment: ra  BMI 44.14 kg/m²     Wt " Readings from Last 3 Encounters:   10/17/24 (!) 164 kg (362 lb 9.6 oz)   09/16/24 (!) 164 kg (360 lb 12.8 oz)   03/17/23 (!) 169 kg (373 lb 6.4 oz)       Result Review :                Physical Exam  Vitals reviewed.   Constitutional:       Appearance: Normal appearance. He is well-developed.   HENT:      Head: Normocephalic and atraumatic.   Eyes:      General:         Right eye: No discharge.         Left eye: No discharge.   Cardiovascular:      Rate and Rhythm: Normal rate and regular rhythm.      Heart sounds: Normal heart sounds. No murmur heard.     No friction rub. No gallop.   Pulmonary:      Effort: Pulmonary effort is normal. No respiratory distress.      Breath sounds: Normal breath sounds. No wheezing or rales.   Musculoskeletal:      Comments: Right ring finger in brace. Mild tenderness   Skin:     General: Skin is warm and dry.      Findings: No rash.   Neurological:      Mental Status: He is alert and oriented to person, place, and time.      Coordination: Coordination normal.      Gait: Gait normal.   Psychiatric:         Behavior: Behavior is cooperative.         Assessment and Plan:  Problems Addressed this Visit          Endocrine and Metabolic    Class 3 severe obesity due to excess calories with serious comorbidity and body mass index (BMI) of 40.0 to 44.9 in adult     Patient's (Body mass index is 44.14 kg/m².) indicates that they are obese (BMI >30) with health conditions that include none . Weight is worsening. BMI  is above average; BMI management plan is completed. We discussed low calorie, low carb based diet program, portion control, and increasing exercise.             Musculoskeletal and Injuries    Trapezius muscle spasm    Relevant Medications    cyclobenzaprine (FLEXERIL) 10 MG tablet    Polyarthralgia     Worsening  Start celebrex    Diagnosis, treatment and and course discussed. Potential side effects discussed. Return if there is worsening or persistence of symptoms.             Relevant Medications    celecoxib (CeleBREX) 200 MG capsule       Neuro    Cervical radiculopathy     Comes and goes  Refill flexeril         Relevant Medications    cyclobenzaprine (FLEXERIL) 10 MG tablet       Tobacco    History of tobacco use     Other Visit Diagnoses       Injury of finger of right hand, subsequent encounter    -  Primary    Need for influenza vaccination        Relevant Orders    Fluzone >6mos (Completed)          Diagnoses         Codes Comments    Injury of finger of right hand, subsequent encounter    -  Primary ICD-10-CM: S69.91XD  ICD-9-CM: V58.89, 959.5     Trapezius muscle spasm     ICD-10-CM: M62.838  ICD-9-CM: 728.85     Cervical radiculopathy     ICD-10-CM: M54.12  ICD-9-CM: 723.4     Need for influenza vaccination     ICD-10-CM: Z23  ICD-9-CM: V04.81     Class 3 severe obesity due to excess calories with serious comorbidity and body mass index (BMI) of 40.0 to 44.9 in adult     ICD-10-CM: E66.813, E66.01, Z68.41  ICD-9-CM: 278.01, V85.41     History of tobacco use     ICD-10-CM: Z87.891  ICD-9-CM: V15.82     Polyarthralgia     ICD-10-CM: M25.50  ICD-9-CM: 719.49                          An After Visit Summary and PPPS were given to the patient.       This document is intended for medical expert use only. Reading of this document by patients and/or patient's family without participating medical staff guidance may result in misinterpretation and unintended morbidity. Any interpretation of such data is the responsibility of the patient and/or family member responsible for the patient in concert with their primary or specialist providers, not to be left for sources of online searches such as [employer], [email or web address] or similar queries. Relying on these approaches to knowledge may result in misinterpretation, misguided goals of care and even death should patients or family members try recommendations outside of the realm of professional medical care.

## 2024-10-17 ENCOUNTER — OFFICE VISIT (OUTPATIENT)
Dept: FAMILY MEDICINE CLINIC | Facility: CLINIC | Age: 59
End: 2024-10-17
Payer: COMMERCIAL

## 2024-10-17 VITALS
SYSTOLIC BLOOD PRESSURE: 150 MMHG | HEART RATE: 72 BPM | BODY MASS INDEX: 38.36 KG/M2 | OXYGEN SATURATION: 98 % | RESPIRATION RATE: 19 BRPM | WEIGHT: 315 LBS | DIASTOLIC BLOOD PRESSURE: 90 MMHG | HEIGHT: 76 IN | TEMPERATURE: 97.1 F

## 2024-10-17 DIAGNOSIS — E66.01 CLASS 3 SEVERE OBESITY DUE TO EXCESS CALORIES WITH SERIOUS COMORBIDITY AND BODY MASS INDEX (BMI) OF 40.0 TO 44.9 IN ADULT: ICD-10-CM

## 2024-10-17 DIAGNOSIS — S69.91XD INJURY OF FINGER OF RIGHT HAND, SUBSEQUENT ENCOUNTER: Primary | ICD-10-CM

## 2024-10-17 DIAGNOSIS — M62.838 TRAPEZIUS MUSCLE SPASM: ICD-10-CM

## 2024-10-17 DIAGNOSIS — Z23 NEED FOR INFLUENZA VACCINATION: ICD-10-CM

## 2024-10-17 DIAGNOSIS — M25.50 POLYARTHRALGIA: ICD-10-CM

## 2024-10-17 DIAGNOSIS — Z87.891 HISTORY OF TOBACCO USE: ICD-10-CM

## 2024-10-17 DIAGNOSIS — M54.12 CERVICAL RADICULOPATHY: ICD-10-CM

## 2024-10-17 DIAGNOSIS — E66.813 CLASS 3 SEVERE OBESITY DUE TO EXCESS CALORIES WITH SERIOUS COMORBIDITY AND BODY MASS INDEX (BMI) OF 40.0 TO 44.9 IN ADULT: ICD-10-CM

## 2024-10-17 RX ORDER — CELECOXIB 200 MG/1
200 CAPSULE ORAL 2 TIMES DAILY
Qty: 60 CAPSULE | Refills: 12 | Status: SHIPPED | OUTPATIENT
Start: 2024-10-17

## 2024-10-17 RX ORDER — IBUPROFEN 800 MG/1
TABLET, FILM COATED ORAL
Qty: 90 TABLET | Refills: 2 | Status: CANCELLED | OUTPATIENT
Start: 2024-10-17

## 2024-10-17 RX ORDER — CYCLOBENZAPRINE HCL 10 MG
10 TABLET ORAL NIGHTLY PRN
Qty: 30 TABLET | Refills: 0 | Status: CANCELLED | OUTPATIENT
Start: 2024-10-17

## 2024-10-17 RX ORDER — CYCLOBENZAPRINE HCL 10 MG
10 TABLET ORAL NIGHTLY PRN
Qty: 30 TABLET | Refills: 0 | Status: SHIPPED | OUTPATIENT
Start: 2024-10-17

## 2024-10-31 NOTE — ASSESSMENT & PLAN NOTE
Patient's (Body mass index is 44.14 kg/m².) indicates that they are obese (BMI >30) with health conditions that include none . Weight is worsening. BMI  is above average; BMI management plan is completed. We discussed low calorie, low carb based diet program, portion control, and increasing exercise.

## 2024-10-31 NOTE — ASSESSMENT & PLAN NOTE
Worsening  Start celebrex    Diagnosis, treatment and and course discussed. Potential side effects discussed. Return if there is worsening or persistence of symptoms.

## 2024-11-04 NOTE — PROGRESS NOTES
Subjective   Alexander Wayne is a 59 y.o. male. Presents to [facility]    Chief Complaint   Patient presents with    Hypertension    Elbow Pain       Hypertension  This is a new problem. The current episode started 1 to 4 weeks ago. The problem is controlled. Associated symptoms include headaches. Pertinent negatives include no chest pain, malaise/fatigue, orthopnea, palpitations or shortness of breath. Risk factors for coronary artery disease include male gender and obesity. Current antihypertension treatment includes nothing.   Elbow Pain  This is a new problem. The current episode started 1 to 4 weeks ago. The problem occurs daily. The problem has been unchanged. Associated symptoms include headaches. Pertinent negatives include no abdominal pain, chest pain, fatigue, numbness or weakness. Exacerbated by: with palpitation. He has tried nothing for the symptoms.      He is having pain in both elbows. He has been using brace. Worse in right elbow after hitting it on counter a few weeks ago    I personally reviewed and updated the patient's allergies, medications, problem list, and past medical, surgical, social, and family history. I have reviewed and confirmed the accuracy of the History of Present Illness and Review of Symptoms as documented by the MA/LPN/RN. Melissa Waldron MD    Allergies:  Allergies   Allergen Reactions    Ciprofloxacin Hives       Social History:  Social History     Socioeconomic History    Marital status:    Tobacco Use    Smoking status: Former     Current packs/day: 0.00     Types: Cigarettes     Quit date: 10/21/2015     Years since quittin.0    Smokeless tobacco: Former   Substance and Sexual Activity    Alcohol use: Yes     Comment: SOCIAL    Drug use: No       Family History:  Family History   Problem Relation Age of Onset    Colon cancer Mother     Breast cancer Mother     Hypertension Father     Diabetes Maternal Grandmother     Lung cancer Maternal  Grandfather     Liver cancer Maternal Grandfather        Past Medical History :  Patient Active Problem List   Diagnosis    Obstructive sleep apnea    AK (actinic keratosis)    Bilateral hearing loss    Edema, lower extremity    History of chicken pox    Idiopathic chronic gout of multiple sites without tophus    Inguinal hernia    Class 3 severe obesity due to excess calories with serious comorbidity and body mass index (BMI) of 40.0 to 44.9 in adult    History of tobacco use    Pain, joint, knee, right    Peptic ulcer    Post-nasal drip    Screening for prostate cancer    Seasonal allergic rhinitis due to pollen    Thrombocytopenia    Umbilical hernia    Left elbow pain    Lateral epicondylitis (tennis elbow)    Right leg pain    Chronic venous hypertension with inflammation    Trapezius muscle spasm    Neck pain    Cervical radiculopathy    Sensation of fullness in both ears    Bilateral impacted cerumen    Boutonniere deformity of finger, right    Other chronic sinusitis    Polyarthralgia    Essential hypertension    Colon cancer screening       Medication List:    Current Outpatient Medications:     colchicine 0.6 MG tablet, TAKE 1 TABLET BY MOUTH 2 TIMES A DAY AS NEEDED FOR MUSCLE / JOINT PAIN., Disp: 180 tablet, Rfl: 0    cyclobenzaprine (FLEXERIL) 10 MG tablet, Take 1 tablet by mouth At Night As Needed for Muscle Spasms., Disp: 30 tablet, Rfl: 0    ibuprofen (ADVIL,MOTRIN) 800 MG tablet, , Disp: , Rfl:     loratadine (CLARITIN) 10 MG tablet, Take 1 tablet by mouth Daily., Disp: , Rfl:     mupirocin (Bactroban) 2 % ointment, Apply  topically to the appropriate area as directed 3 (Three) Times a Day., Disp: 1 each, Rfl: 0    losartan (COZAAR) 25 MG tablet, Take 1 tablet by mouth Daily., Disp: 30 tablet, Rfl: 12    Past Surgical History:  Past Surgical History:   Procedure Laterality Date    INGUINAL HERNIA REPAIR      KNEE LIGAMENT RECONSTRUCTION      LAMINECTOMY      3     LUMBAR DISCECTOMY           Physical  "Exam:      Vital Signs:    Vitals:    11/07/24 1558   BP: 132/88   Pulse: 74   Resp: 19   Temp: 97.1 °F (36.2 °C)   SpO2: 99%        /88 (BP Location: Left arm, Patient Position: Sitting, Cuff Size: Large Adult)   Pulse 74   Temp 97.1 °F (36.2 °C) (Temporal)   Resp 19   Ht 193 cm (76\")   Wt (!) 165 kg (363 lb 3.2 oz)   SpO2 99% Comment: ra  BMI 44.21 kg/m²     Wt Readings from Last 3 Encounters:   11/07/24 (!) 165 kg (363 lb 3.2 oz)   10/17/24 (!) 164 kg (362 lb 9.6 oz)   09/16/24 (!) 164 kg (360 lb 12.8 oz)       Result Review :                Physical Exam  Vitals reviewed.   Constitutional:       Appearance: Normal appearance. He is well-developed.   HENT:      Head: Normocephalic and atraumatic.   Eyes:      General:         Right eye: No discharge.         Left eye: No discharge.   Cardiovascular:      Rate and Rhythm: Normal rate and regular rhythm.      Heart sounds: Normal heart sounds. No murmur heard.     No friction rub. No gallop.   Pulmonary:      Effort: Pulmonary effort is normal. No respiratory distress.      Breath sounds: Normal breath sounds. No wheezing or rales.   Musculoskeletal:      Right elbow: Normal range of motion. Tenderness present in medial epicondyle and lateral epicondyle.   Skin:     General: Skin is warm and dry.      Findings: No rash.   Neurological:      Mental Status: He is alert and oriented to person, place, and time.      Coordination: Coordination normal.      Gait: Gait normal.   Psychiatric:         Behavior: Behavior is cooperative.         Assessment and Plan:  Will get xray of elbow  Problems Addressed this Visit          Cardiac and Vasculature    Essential hypertension - Primary     Better but not at goal    Start losartan    Diagnosis, treatment and and course discussed. Potential side effects discussed. Return if there is worsening or persistence of symptoms.            Relevant Medications    losartan (COZAAR) 25 MG tablet       Endocrine and " Metabolic    Class 3 severe obesity due to excess calories with serious comorbidity and body mass index (BMI) of 40.0 to 44.9 in adult     Patient's (Body mass index is 44.21 kg/m².) indicates that they are obese (BMI >30) with health conditions that include none . Weight is worsening. BMI  is above average; BMI management plan is completed. We discussed low calorie, low carb based diet program, portion control, and increasing exercise. .             Gastrointestinal Abdominal     Colon cancer screening     Given packet for GSI            Tobacco    History of tobacco use     Other Visit Diagnoses       Right elbow pain        Relevant Orders    XR Elbow 2 View Right          Diagnoses         Codes Comments    Essential hypertension    -  Primary ICD-10-CM: I10  ICD-9-CM: 401.9     Class 3 severe obesity due to excess calories with serious comorbidity and body mass index (BMI) of 40.0 to 44.9 in adult     ICD-10-CM: E66.813, E66.01, Z68.41  ICD-9-CM: 278.01, V85.41     History of tobacco use     ICD-10-CM: Z87.891  ICD-9-CM: V15.82     Right elbow pain     ICD-10-CM: M25.521  ICD-9-CM: 719.42     Colon cancer screening     ICD-10-CM: Z12.11  ICD-9-CM: V76.51                          An After Visit Summary and PPPS were given to the patient.       This document is intended for medical expert use only. Reading of this document by patients and/or patient's family without participating medical staff guidance may result in misinterpretation and unintended morbidity. Any interpretation of such data is the responsibility of the patient and/or family member responsible for the patient in concert with their primary or specialist providers, not to be left for sources of online searches such as [employer], [employer] or similar queries. Relying on these approaches to knowledge may result in misinterpretation, misguided goals of care and even death should patients or family members try recommendations outside of the realm of  professional medical care.

## 2024-11-07 ENCOUNTER — OFFICE VISIT (OUTPATIENT)
Dept: FAMILY MEDICINE CLINIC | Facility: CLINIC | Age: 59
End: 2024-11-07
Payer: COMMERCIAL

## 2024-11-07 VITALS
BODY MASS INDEX: 38.36 KG/M2 | OXYGEN SATURATION: 99 % | DIASTOLIC BLOOD PRESSURE: 88 MMHG | TEMPERATURE: 97.1 F | SYSTOLIC BLOOD PRESSURE: 132 MMHG | HEIGHT: 76 IN | HEART RATE: 74 BPM | WEIGHT: 315 LBS | RESPIRATION RATE: 19 BRPM

## 2024-11-07 DIAGNOSIS — E66.813 CLASS 3 SEVERE OBESITY DUE TO EXCESS CALORIES WITH SERIOUS COMORBIDITY AND BODY MASS INDEX (BMI) OF 40.0 TO 44.9 IN ADULT: ICD-10-CM

## 2024-11-07 DIAGNOSIS — M25.521 RIGHT ELBOW PAIN: ICD-10-CM

## 2024-11-07 DIAGNOSIS — Z87.891 HISTORY OF TOBACCO USE: ICD-10-CM

## 2024-11-07 DIAGNOSIS — Z12.11 COLON CANCER SCREENING: ICD-10-CM

## 2024-11-07 DIAGNOSIS — E66.01 CLASS 3 SEVERE OBESITY DUE TO EXCESS CALORIES WITH SERIOUS COMORBIDITY AND BODY MASS INDEX (BMI) OF 40.0 TO 44.9 IN ADULT: ICD-10-CM

## 2024-11-07 DIAGNOSIS — I10 ESSENTIAL HYPERTENSION: Primary | ICD-10-CM

## 2024-11-07 PROBLEM — R03.0 ELEVATED BLOOD PRESSURE READING: Status: ACTIVE | Noted: 2024-11-07

## 2024-11-07 RX ORDER — LOSARTAN POTASSIUM 25 MG/1
25 TABLET ORAL DAILY
Qty: 30 TABLET | Refills: 12 | Status: SHIPPED | OUTPATIENT
Start: 2024-11-07

## 2024-11-07 RX ORDER — IBUPROFEN 800 MG/1
TABLET, FILM COATED ORAL
COMMUNITY

## 2024-11-07 NOTE — ASSESSMENT & PLAN NOTE
Better but not at goal    Start losartan    Diagnosis, treatment and and course discussed. Potential side effects discussed. Return if there is worsening or persistence of symptoms.

## 2024-11-09 NOTE — ASSESSMENT & PLAN NOTE
Patient's (Body mass index is 44.21 kg/m².) indicates that they are obese (BMI >30) with health conditions that include none . Weight is worsening. BMI  is above average; BMI management plan is completed. We discussed low calorie, low carb based diet program, portion control, and increasing exercise. .

## 2024-11-15 DIAGNOSIS — I10 ESSENTIAL HYPERTENSION: Primary | ICD-10-CM

## 2024-11-15 DIAGNOSIS — M1A.09X0 IDIOPATHIC CHRONIC GOUT OF MULTIPLE SITES WITHOUT TOPHUS: ICD-10-CM

## 2024-11-15 DIAGNOSIS — E78.5 DYSLIPIDEMIA: ICD-10-CM

## 2024-11-15 DIAGNOSIS — I10 ESSENTIAL HYPERTENSION: ICD-10-CM

## 2024-11-15 RX ORDER — IBUPROFEN 800 MG/1
800 TABLET, FILM COATED ORAL EVERY 8 HOURS PRN
Qty: 90 TABLET | Refills: 1 | Status: SHIPPED | OUTPATIENT
Start: 2024-11-15

## 2024-11-15 RX ORDER — LOSARTAN POTASSIUM 50 MG/1
50 TABLET ORAL DAILY
Qty: 30 TABLET | Refills: 3 | Status: SHIPPED | OUTPATIENT
Start: 2024-11-15

## 2024-11-15 RX ORDER — COLCHICINE 0.6 MG/1
0.6 TABLET ORAL 2 TIMES DAILY PRN
Qty: 60 TABLET | Refills: 1 | Status: SHIPPED | OUTPATIENT
Start: 2024-11-15

## 2024-11-15 NOTE — TELEPHONE ENCOUNTER
I ordered labs I need before sending this meds  Also, I need labs done at the NP he was seeing and need to know if he had a psa there

## 2024-11-18 DIAGNOSIS — M25.521 RIGHT ELBOW PAIN: ICD-10-CM

## 2024-11-19 LAB
ALBUMIN SERPL-MCNC: 4.4 G/DL (ref 3.8–4.9)
ALP SERPL-CCNC: 74 IU/L (ref 44–121)
ALT SERPL-CCNC: 36 IU/L (ref 0–44)
AST SERPL-CCNC: 34 IU/L (ref 0–40)
BASOPHILS # BLD AUTO: 0 X10E3/UL (ref 0–0.2)
BASOPHILS NFR BLD AUTO: 0 %
BILIRUB SERPL-MCNC: 0.7 MG/DL (ref 0–1.2)
BUN SERPL-MCNC: 13 MG/DL (ref 8–27)
BUN/CREAT SERPL: 15 (ref 10–24)
CALCIUM SERPL-MCNC: 9.4 MG/DL (ref 8.6–10.2)
CHLORIDE SERPL-SCNC: 102 MMOL/L (ref 96–106)
CHOLEST SERPL-MCNC: 116 MG/DL (ref 100–199)
CHOLEST/HDLC SERPL: 2 RATIO (ref 0–5)
CO2 SERPL-SCNC: 23 MMOL/L (ref 20–29)
CREAT SERPL-MCNC: 0.85 MG/DL (ref 0.76–1.27)
EGFRCR SERPLBLD CKD-EPI 2021: 99 ML/MIN/1.73
EOSINOPHIL # BLD AUTO: 0.1 X10E3/UL (ref 0–0.4)
EOSINOPHIL NFR BLD AUTO: 2 %
ERYTHROCYTE [DISTWIDTH] IN BLOOD BY AUTOMATED COUNT: 12.9 % (ref 11.6–15.4)
GLOBULIN SER CALC-MCNC: 2.2 G/DL (ref 1.5–4.5)
GLUCOSE SERPL-MCNC: 110 MG/DL (ref 70–99)
HCT VFR BLD AUTO: 45.1 % (ref 37.5–51)
HDLC SERPL-MCNC: 57 MG/DL
HGB BLD-MCNC: 15.5 G/DL (ref 13–17.7)
IMM GRANULOCYTES # BLD AUTO: 0 X10E3/UL (ref 0–0.1)
IMM GRANULOCYTES NFR BLD AUTO: 0 %
LDLC SERPL CALC-MCNC: 43 MG/DL (ref 0–99)
LYMPHOCYTES # BLD AUTO: 1.9 X10E3/UL (ref 0.7–3.1)
LYMPHOCYTES NFR BLD AUTO: 35 %
MCH RBC QN AUTO: 30.6 PG (ref 26.6–33)
MCHC RBC AUTO-ENTMCNC: 34.4 G/DL (ref 31.5–35.7)
MCV RBC AUTO: 89 FL (ref 79–97)
MONOCYTES # BLD AUTO: 0.5 X10E3/UL (ref 0.1–0.9)
MONOCYTES NFR BLD AUTO: 10 %
NEUTROPHILS # BLD AUTO: 2.8 X10E3/UL (ref 1.4–7)
NEUTROPHILS NFR BLD AUTO: 53 %
PLATELET # BLD AUTO: 137 X10E3/UL (ref 150–450)
POTASSIUM SERPL-SCNC: 3.9 MMOL/L (ref 3.5–5.2)
PROT SERPL-MCNC: 6.6 G/DL (ref 6–8.5)
RBC # BLD AUTO: 5.06 X10E6/UL (ref 4.14–5.8)
SODIUM SERPL-SCNC: 141 MMOL/L (ref 134–144)
TRIGL SERPL-MCNC: 84 MG/DL (ref 0–149)
TSH SERPL DL<=0.005 MIU/L-ACNC: 1.1 UIU/ML (ref 0.45–4.5)
URATE SERPL-MCNC: 7.6 MG/DL (ref 3.8–8.4)
VLDLC SERPL CALC-MCNC: 16 MG/DL (ref 5–40)
WBC # BLD AUTO: 5.4 X10E3/UL (ref 3.4–10.8)

## 2024-12-09 DIAGNOSIS — I10 ESSENTIAL HYPERTENSION: ICD-10-CM

## 2024-12-09 RX ORDER — LOSARTAN POTASSIUM 100 MG/1
100 TABLET ORAL DAILY
Qty: 30 TABLET | Refills: 3 | Status: SHIPPED | OUTPATIENT
Start: 2024-12-09

## 2025-01-10 ENCOUNTER — TELEPHONE (OUTPATIENT)
Dept: FAMILY MEDICINE CLINIC | Facility: CLINIC | Age: 60
End: 2025-01-10

## 2025-01-10 NOTE — TELEPHONE ENCOUNTER
Tried calling pt again to advise the office is closing around 2 today due to weather. And  if patient can call back by at least 130 pm so we can reschedule if we do not hear from him today we will call him again Monday.

## 2025-01-27 RX ORDER — COLCHICINE 0.6 MG/1
0.6 TABLET ORAL 2 TIMES DAILY PRN
Qty: 60 TABLET | Refills: 2 | Status: SHIPPED | OUTPATIENT
Start: 2025-01-27

## 2025-01-30 RX ORDER — IBUPROFEN 800 MG/1
800 TABLET, FILM COATED ORAL EVERY 8 HOURS PRN
Qty: 90 TABLET | Refills: 1 | Status: SHIPPED | OUTPATIENT
Start: 2025-01-30

## 2025-02-25 NOTE — PROGRESS NOTES
Subjective   Alexander Wayne is a 60 y.o. male. Presents to [facility] MEDICAL [facility]    Chief Complaint   Patient presents with    Hypertension       Hypertension  This is a new problem. The current episode started 1 to 4 weeks ago. The problem is uncontrolled. Associated symptoms include headaches. Pertinent negatives include no chest pain, malaise/fatigue, orthopnea, palpitations, shortness of breath or sweats. (Ringing in ears ) Risk factors for coronary artery disease include male gender and obesity. Current antihypertension treatment includes angiotensin blockers.        I personally reviewed and updated the patient's allergies, medications, problem list, and past medical, surgical, social, and family history. I have reviewed and confirmed the accuracy of the History of Present Illness and Review of Symptoms as documented by the MA/LPN/RN. Melissa Waldron MD    Allergies:  Allergies   Allergen Reactions    Ciprofloxacin Hives       Social History:  Social History     Socioeconomic History    Marital status:    Tobacco Use    Smoking status: Former     Current packs/day: 0.00     Types: Cigarettes     Quit date: 10/21/2015     Years since quittin.3    Smokeless tobacco: Former   Substance and Sexual Activity    Alcohol use: Yes     Comment: SOCIAL    Drug use: No       Family History:  Family History   Problem Relation Age of Onset    Colon cancer Mother     Breast cancer Mother     Hypertension Father     Diabetes Maternal Grandmother     Lung cancer Maternal Grandfather     Liver cancer Maternal Grandfather        Past Medical History :  Patient Active Problem List   Diagnosis    Obstructive sleep apnea    AK (actinic keratosis)    Edema, lower extremity    History of chicken pox    Idiopathic chronic gout of multiple sites without tophus    Inguinal hernia    Class 3 severe obesity due to excess calories with serious comorbidity and body mass index (BMI) of 45.0 to 49.9 in adult    History of  tobacco use    Pain, joint, knee, right    Peptic ulcer    Post-nasal drip    Screening for prostate cancer    Seasonal allergic rhinitis due to pollen    Thrombocytopenia    Umbilical hernia    Left elbow pain    Lateral epicondylitis (tennis elbow)    Right leg pain    Chronic venous hypertension with inflammation    Trapezius muscle spasm    Neck pain    Cervical radiculopathy    Sensation of fullness in both ears    Boutonniere deformity of finger, right    Other chronic sinusitis    Polyarthralgia    Essential hypertension    Colon cancer screening    Bilateral hearing loss due to cerumen impaction       Medication List:    Current Outpatient Medications:     Boswellia [place] (BOSWELLIA PO), , Disp: , Rfl:     colchicine 0.6 MG tablet, TAKE 1 TABLET BY MOUTH 2 (TWO) TIMES A DAY AS NEEDED (GOUT FLARE)., Disp: 60 tablet, Rfl: 2    cyclobenzaprine (FLEXERIL) 10 MG tablet, Take 1 tablet by mouth At Night As Needed for Muscle Spasms., Disp: 30 tablet, Rfl: 0    ibuprofen (ADVIL,MOTRIN) 800 MG tablet, TAKE 1 TABLET BY MOUTH EVERY 8 (EIGHT) HOURS AS NEEDED FOR MILD PAIN OR MODERATE PAIN., Disp: 90 tablet, Rfl: 1    loratadine (CLARITIN) 10 MG tablet, Take 1 tablet by mouth Daily., Disp: , Rfl:     losartan (COZAAR) 100 MG tablet, Take 1 tablet by mouth Daily., Disp: 90 tablet, Rfl: 3    mupirocin (Bactroban) 2 % ointment, Apply  topically to the appropriate area as directed 3 (Three) Times a Day., Disp: 1 each, Rfl: 0    Probiotic Product (Probiotic-10 Ultimate) capsule, , Disp: , Rfl:     [place] Bark powder, , Disp: , Rfl:     amLODIPine (NORVASC) 2.5 MG tablet, Take 1 tablet by mouth Daily., Disp: 30 tablet, Rfl: 2    Past Surgical History:  Past Surgical History:   Procedure Laterality Date    INGUINAL HERNIA REPAIR      KNEE LIGAMENT RECONSTRUCTION      LAMINECTOMY      3     LUMBAR DISCECTOMY           Physical Exam:      Vital Signs:        /90   Pulse 74   Temp 97.7 °F (36.5 °C) (Temporal)   Resp 19   " Ht 193 cm (76\")   Wt (!) 169 kg (372 lb 3.2 oz)   SpO2 98% Comment: ra  BMI 45.31 kg/m²     Wt Readings from Last 3 Encounters:   03/03/25 (!) 169 kg (372 lb 3.2 oz)   11/07/24 (!) 165 kg (363 lb 3.2 oz)   10/17/24 (!) 164 kg (362 lb 9.6 oz)       Result Review :          Ear Cerumen Removal    Date/Time: 3/12/2025 6:36 PM    Performed by: Melissa Waldron MD  Authorized by: Melissa Waldron MD    Anesthesia:  Local Anesthetic: none  Ceruminolytics applied: Ceruminolytics applied prior to the procedure.  Location details: left ear and right ear  Patient tolerance: patient tolerated the procedure well with no immediate complications  Procedure type: irrigation   Sedation:  Patient sedated: no             Physical Exam  Vitals reviewed.   Constitutional:       Appearance: Normal appearance. He is well-developed.   HENT:      Head: Normocephalic and atraumatic.      Right Ear: External ear normal. No decreased hearing noted. No tenderness. No middle ear effusion. There is impacted cerumen. Tympanic membrane is not erythematous or bulging.      Left Ear: External ear normal. No decreased hearing noted. No tenderness.  No middle ear effusion. There is impacted cerumen. Tympanic membrane is not erythematous or bulging.      Nose: Nose normal.      Mouth/Throat:      Pharynx: No oropharyngeal exudate or posterior oropharyngeal erythema.   Eyes:      General:         Right eye: No discharge.         Left eye: No discharge.   Cardiovascular:      Rate and Rhythm: Normal rate and regular rhythm.      Heart sounds: Normal heart sounds. No murmur heard.     No friction rub. No gallop.   Pulmonary:      Effort: Pulmonary effort is normal. No respiratory distress.      Breath sounds: Normal breath sounds. No wheezing or rales.   Skin:     General: Skin is warm and dry.      Findings: No rash.   Neurological:      Mental Status: He is alert and oriented to person, place, and time.      Coordination: Coordination normal.     "  Gait: Gait normal.   Psychiatric:         Behavior: Behavior is cooperative.       Assessment and Plan:  Problems Addressed this Visit          Cardiac and Vasculature    Essential hypertension - Primary    Hypertension is borderline  Medication changes per orders.  Dietary sodium restriction.  Weight loss.  Blood pressure will be reassessedin 4 weeks.         Relevant Medications    amLODIPine (NORVASC) 2.5 MG tablet    losartan (COZAAR) 100 MG tablet       ENT    Bilateral hearing loss due to cerumen impaction    Irrigated in office.   No complications         Relevant Orders    Ear Cerumen Removal       Endocrine and Metabolic    Class 3 severe obesity due to excess calories with serious comorbidity and body mass index (BMI) of 45.0 to 49.9 in adult    Patient's (Body mass index is 45.31 kg/m².) indicates that they are morbidly/severely obese (BMI > 40 or > 35 with obesity - related health condition) with health conditions that include hypertension . Weight is worsening. BMI  is above average; BMI management plan is completed. We discussed low calorie, low carb based diet program, portion control, and increasing exercise.             Tobacco    History of tobacco use     Diagnoses         Codes Comments      Essential hypertension    -  Primary ICD-10-CM: I10  ICD-9-CM: 401.9       Class 3 severe obesity due to excess calories with serious comorbidity and body mass index (BMI) of 45.0 to 49.9 in adult     ICD-10-CM: E66.813, Z68.42, E66.01  ICD-9-CM: 278.01, V85.42       History of tobacco use     ICD-10-CM: Z87.891  ICD-9-CM: V15.82       Bilateral hearing loss due to cerumen impaction     ICD-10-CM: H61.23  ICD-9-CM: 389.8, 380.4                          An After Visit Summary and PPPS were given to the patient.       This document is intended for medical expert use only. Reading of this document by patients and/or patient's family without participating medical staff guidance may result in misinterpretation  and unintended morbidity. Any interpretation of such data is the responsibility of the patient and/or family member responsible for the patient in concert with their primary or specialist providers, not to be left for sources of online searches such as [employer], [employer] or similar queries. Relying on these approaches to knowledge may result in misinterpretation, misguided goals of care and even death should patients or family members try recommendations outside of the realm of professional medical care.

## 2025-03-03 ENCOUNTER — OFFICE VISIT (OUTPATIENT)
Dept: FAMILY MEDICINE CLINIC | Facility: CLINIC | Age: 60
End: 2025-03-03
Payer: COMMERCIAL

## 2025-03-03 VITALS
DIASTOLIC BLOOD PRESSURE: 90 MMHG | HEART RATE: 74 BPM | RESPIRATION RATE: 19 BRPM | TEMPERATURE: 97.7 F | HEIGHT: 76 IN | OXYGEN SATURATION: 98 % | WEIGHT: 315 LBS | BODY MASS INDEX: 38.36 KG/M2 | SYSTOLIC BLOOD PRESSURE: 132 MMHG

## 2025-03-03 DIAGNOSIS — H61.23 BILATERAL HEARING LOSS DUE TO CERUMEN IMPACTION: ICD-10-CM

## 2025-03-03 DIAGNOSIS — Z87.891 HISTORY OF TOBACCO USE: ICD-10-CM

## 2025-03-03 DIAGNOSIS — E66.01 CLASS 3 SEVERE OBESITY DUE TO EXCESS CALORIES WITH SERIOUS COMORBIDITY AND BODY MASS INDEX (BMI) OF 45.0 TO 49.9 IN ADULT: ICD-10-CM

## 2025-03-03 DIAGNOSIS — I10 ESSENTIAL HYPERTENSION: Primary | ICD-10-CM

## 2025-03-03 DIAGNOSIS — E66.813 CLASS 3 SEVERE OBESITY DUE TO EXCESS CALORIES WITH SERIOUS COMORBIDITY AND BODY MASS INDEX (BMI) OF 45.0 TO 49.9 IN ADULT: ICD-10-CM

## 2025-03-03 PROBLEM — H91.93 BILATERAL HEARING LOSS: Status: RESOLVED | Noted: 2020-10-20 | Resolved: 2025-03-03

## 2025-03-03 PROCEDURE — 99214 OFFICE O/P EST MOD 30 MIN: CPT | Performed by: FAMILY MEDICINE

## 2025-03-03 RX ORDER — CALCIUM CARBONATE/VITAMIN D3 600MG-62.5
CAPSULE ORAL
COMMUNITY

## 2025-03-03 RX ORDER — AMLODIPINE BESYLATE 2.5 MG/1
2.5 TABLET ORAL DAILY
Qty: 30 TABLET | Refills: 2 | Status: SHIPPED | OUTPATIENT
Start: 2025-03-03

## 2025-03-03 RX ORDER — LOSARTAN POTASSIUM 100 MG/1
100 TABLET ORAL DAILY
Qty: 90 TABLET | Refills: 3 | Status: SHIPPED | OUTPATIENT
Start: 2025-03-03

## 2025-03-12 PROCEDURE — 69209 REMOVE IMPACTED EAR WAX UNI: CPT | Performed by: FAMILY MEDICINE

## 2025-03-12 NOTE — ASSESSMENT & PLAN NOTE
Patient's (Body mass index is 45.31 kg/m².) indicates that they are morbidly/severely obese (BMI > 40 or > 35 with obesity - related health condition) with health conditions that include hypertension . Weight is worsening. BMI  is above average; BMI management plan is completed. We discussed low calorie, low carb based diet program, portion control, and increasing exercise.

## 2025-03-12 NOTE — ASSESSMENT & PLAN NOTE
Hypertension is borderline  Medication changes per orders.  Dietary sodium restriction.  Weight loss.  Blood pressure will be reassessedin 4 weeks.

## 2025-04-03 NOTE — PROGRESS NOTES
Subjective   Alexander Wayne is a 60 y.o. male. Presents to [facility] MEDICAL [facility]    Chief Complaint   Patient presents with   • Ear Fullness   • Hypertension       Ear Fullness   There is pain in both (left is worse) ears. This is a new problem. The current episode started more than 1 month ago. The problem occurs daily. The problem has been unchanged. There has been no fever. The pain is at a severity of 0/10. The patient is experiencing no pain. Pertinent negatives include no drainage, headaches, hearing loss or rhinorrhea. He has tried nothing for the symptoms.   Hypertension  This is a chronic problem. The current episode started more than 1 year ago. The problem is uncontrolled. Pertinent negatives include no blurred vision, headaches, malaise/fatigue, palpitations or shortness of breath. Risk factors for coronary artery disease include male gender, obesity and dyslipidemia. Current antihypertension treatment includes calcium channel blockers and angiotensin blockers. The current treatment provides mild improvement.        I personally reviewed and updated the patient's allergies, medications, problem list, and past medical, surgical, social, and family history. I have reviewed and confirmed the accuracy of the History of Present Illness and Review of Symptoms as documented by the MA/LPN/RN. Melissa Waldron MD    Allergies:  Allergies   Allergen Reactions   • Ciprofloxacin Hives       Social History:  Social History     Socioeconomic History   • Marital status:    Tobacco Use   • Smoking status: Former     Current packs/day: 0.00     Types: Cigarettes     Quit date: 10/21/2015     Years since quittin.5     Passive exposure: Past   • Smokeless tobacco: Former   Vaping Use   • Vaping status: Never Used   Substance and Sexual Activity   • Alcohol use: Yes     Comment: SOCIAL   • Drug use: No       Family History:  Family History   Problem Relation Age of Onset   • Colon cancer Mother    • Breast  cancer Mother    • Hypertension Father    • Diabetes Maternal Grandmother    • Lung cancer Maternal Grandfather    • Liver cancer Maternal Grandfather        Past Medical History :  Patient Active Problem List   Diagnosis   • Obstructive sleep apnea   • AK (actinic keratosis)   • Edema, lower extremity   • History of chicken pox   • Idiopathic chronic gout of multiple sites without tophus   • Inguinal hernia   • Class 3 severe obesity due to excess calories with serious comorbidity and body mass index (BMI) of 45.0 to 49.9 in adult   • History of tobacco use   • Pain, joint, knee, right   • Peptic ulcer   • Post-nasal drip   • Screening for prostate cancer   • Seasonal allergic rhinitis due to pollen   • Thrombocytopenia   • Umbilical hernia   • Left elbow pain   • Lateral epicondylitis (tennis elbow)   • Right leg pain   • Chronic venous hypertension with inflammation   • Trapezius muscle spasm   • Neck pain   • Cervical radiculopathy   • Sensation of fullness in both ears   • Boutonniere deformity of finger, right   • Other chronic sinusitis   • Polyarthralgia   • Essential hypertension   • Colon cancer screening   • Bilateral hearing loss due to cerumen impaction       Medication List:    Current Outpatient Medications:   •  amLODIPine (NORVASC) 5 MG tablet, Take 1 tablet by mouth Daily., Disp: 30 tablet, Rfl: 3  •  colchicine 0.6 MG tablet, TAKE 1 TABLET BY MOUTH 2 (TWO) TIMES A DAY AS NEEDED (GOUT FLARE)., Disp: 60 tablet, Rfl: 2  •  cyclobenzaprine (FLEXERIL) 10 MG tablet, Take 1 tablet by mouth At Night As Needed for Muscle Spasms., Disp: 30 tablet, Rfl: 0  •  ibuprofen (ADVIL,MOTRIN) 800 MG tablet, Take 1 tablet by mouth Every 8 (Eight) Hours As Needed for Mild Pain or Moderate Pain., Disp: 90 tablet, Rfl: 1  •  loratadine (CLARITIN) 10 MG tablet, Take 1 tablet by mouth Daily., Disp: , Rfl:   •  losartan (COZAAR) 100 MG tablet, Take 1 tablet by mouth Daily., Disp: 90 tablet, Rfl: 3  •  mupirocin (Bactroban)  "2 % ointment, Apply  topically to the appropriate area as directed 3 (Three) Times a Day., Disp: 1 each, Rfl: 0  •  Probiotic Product (Probiotic-10 Ultimate) capsule, , Disp: , Rfl:     Past Surgical History:  Past Surgical History:   Procedure Laterality Date   • INGUINAL HERNIA REPAIR     • KNEE LIGAMENT RECONSTRUCTION     • LAMINECTOMY      3    • LUMBAR DISCECTOMY           Physical Exam:      Vital Signs:    Vitals:    04/07/25 1610   BP: 142/82   Pulse: 71   Resp: 18   Temp: 97.8 °F (36.6 °C)   SpO2: 98%        /82 (BP Location: Right arm, Patient Position: Sitting, Cuff Size: Adult)   Pulse 71   Temp 97.8 °F (36.6 °C) (Temporal)   Resp 18   Ht 193 cm (76\")   Wt (!) 169 kg (372 lb 9.6 oz)   SpO2 98% Comment: ra  BMI 45.35 kg/m²     Wt Readings from Last 3 Encounters:   04/07/25 (!) 169 kg (372 lb 9.6 oz)   03/03/25 (!) 169 kg (372 lb 3.2 oz)   11/07/24 (!) 165 kg (363 lb 3.2 oz)       Result Review :          Ear Cerumen Removal    Date/Time: 4/20/2025 6:01 PM    Performed by: Melissa Waldron MD  Authorized by: Melissa Waldron MD  Location details: right ear and left ear  Patient tolerance: patient tolerated the procedure well with no immediate complications  Procedure type: irrigation   Sedation:  Patient sedated: no           Physical Exam  Vitals reviewed.   Constitutional:       Appearance: Normal appearance. He is well-developed.   HENT:      Head: Normocephalic and atraumatic.      Right Ear: External ear normal. There is impacted cerumen.      Left Ear: External ear normal. There is impacted cerumen.   Eyes:      General:         Right eye: No discharge.         Left eye: No discharge.   Cardiovascular:      Rate and Rhythm: Normal rate and regular rhythm.      Heart sounds: Normal heart sounds. No murmur heard.     No friction rub. No gallop.   Pulmonary:      Effort: Pulmonary effort is normal. No respiratory distress.      Breath sounds: Normal breath sounds. No wheezing or rales. "   Skin:     General: Skin is warm and dry.      Findings: No rash.   Neurological:      Mental Status: He is alert and oriented to person, place, and time.      Coordination: Coordination normal.      Gait: Gait normal.   Psychiatric:         Behavior: Behavior is cooperative.       Assessment and Plan:  Problems Addressed this Visit          Cardiac and Vasculature    Essential hypertension    Hypertension is borderline  Medication changes per orders.  Dietary sodium restriction.  Weight loss.  Blood pressure will be reassessedin 4 weeks.         Relevant Medications    amLODIPine (NORVASC) 5 MG tablet       ENT    Bilateral hearing loss due to cerumen impaction - Primary    Irrigated in office.   No complications            Endocrine and Metabolic    Class 3 severe obesity due to excess calories with serious comorbidity and body mass index (BMI) of 45.0 to 49.9 in adult    Patient's (Body mass index is 45.35 kg/m².) indicates that they are morbidly/severely obese (BMI > 40 or > 35 with obesity - related health condition) with health conditions that include hypertension . Weight is unchanged. BMI  is above average; BMI management plan is completed. We discussed low calorie, low carb based diet program, portion control, and increasing exercise.             Tobacco    History of tobacco use     Diagnoses         Codes Comments      Bilateral hearing loss due to cerumen impaction    -  Primary ICD-10-CM: H61.23  ICD-9-CM: 389.8, 380.4       History of tobacco use     ICD-10-CM: Z87.891  ICD-9-CM: V15.82       Class 3 severe obesity due to excess calories with serious comorbidity and body mass index (BMI) of 45.0 to 49.9 in adult     ICD-10-CM: E66.813, Z68.42, E66.01  ICD-9-CM: 278.01, V85.42       Essential hypertension     ICD-10-CM: I10  ICD-9-CM: 401.9                          An After Visit Summary and PPPS were given to the patient.       This document is intended for medical expert use only. Reading of this  document by patients and/or patient's family without participating medical staff guidance may result in misinterpretation and unintended morbidity. Any interpretation of such data is the responsibility of the patient and/or family member responsible for the patient in concert with their primary or specialist providers, not to be left for sources of online searches such as [employer], [employer] or similar queries. Relying on these approaches to knowledge may result in misinterpretation, misguided goals of care and even death should patients or family members try recommendations outside of the realm of professional medical care.

## 2025-04-07 ENCOUNTER — OFFICE VISIT (OUTPATIENT)
Dept: FAMILY MEDICINE CLINIC | Facility: CLINIC | Age: 60
End: 2025-04-07
Payer: COMMERCIAL

## 2025-04-07 VITALS
TEMPERATURE: 97.8 F | OXYGEN SATURATION: 98 % | HEART RATE: 71 BPM | HEIGHT: 76 IN | BODY MASS INDEX: 38.36 KG/M2 | RESPIRATION RATE: 18 BRPM | WEIGHT: 315 LBS | DIASTOLIC BLOOD PRESSURE: 82 MMHG | SYSTOLIC BLOOD PRESSURE: 142 MMHG

## 2025-04-07 DIAGNOSIS — E66.813 CLASS 3 SEVERE OBESITY DUE TO EXCESS CALORIES WITH SERIOUS COMORBIDITY AND BODY MASS INDEX (BMI) OF 45.0 TO 49.9 IN ADULT: ICD-10-CM

## 2025-04-07 DIAGNOSIS — H61.23 BILATERAL HEARING LOSS DUE TO CERUMEN IMPACTION: Primary | ICD-10-CM

## 2025-04-07 DIAGNOSIS — Z87.891 HISTORY OF TOBACCO USE: ICD-10-CM

## 2025-04-07 DIAGNOSIS — E66.01 CLASS 3 SEVERE OBESITY DUE TO EXCESS CALORIES WITH SERIOUS COMORBIDITY AND BODY MASS INDEX (BMI) OF 45.0 TO 49.9 IN ADULT: ICD-10-CM

## 2025-04-07 DIAGNOSIS — I10 ESSENTIAL HYPERTENSION: ICD-10-CM

## 2025-04-07 PROCEDURE — 99214 OFFICE O/P EST MOD 30 MIN: CPT | Performed by: FAMILY MEDICINE

## 2025-04-07 RX ORDER — IBUPROFEN 800 MG/1
800 TABLET, FILM COATED ORAL EVERY 8 HOURS PRN
Qty: 90 TABLET | Refills: 1 | OUTPATIENT
Start: 2025-04-07

## 2025-04-07 RX ORDER — IBUPROFEN 800 MG/1
800 TABLET, FILM COATED ORAL EVERY 8 HOURS PRN
Qty: 90 TABLET | Refills: 1 | Status: SHIPPED | OUTPATIENT
Start: 2025-04-07

## 2025-04-07 RX ORDER — AMLODIPINE BESYLATE 5 MG/1
5 TABLET ORAL DAILY
Qty: 30 TABLET | Refills: 3 | Status: SHIPPED | OUTPATIENT
Start: 2025-04-07

## 2025-04-07 NOTE — ASSESSMENT & PLAN NOTE
Patient's (Body mass index is 45.35 kg/m².) indicates that they are morbidly/severely obese (BMI > 40 or > 35 with obesity - related health condition) with health conditions that include hypertension . Weight is unchanged. BMI  is above average; BMI management plan is completed. We discussed low calorie, low carb based diet program, portion control, and increasing exercise.

## 2025-04-20 PROCEDURE — 69209 REMOVE IMPACTED EAR WAX UNI: CPT | Performed by: FAMILY MEDICINE

## 2025-05-08 NOTE — PROGRESS NOTES
Subjective   Alexander Wayne is a 60 y.o. male. Presents to [facility]    Chief Complaint   Patient presents with    Hypertension    Hyperlipidemia       Hypertension  This is a chronic problem. The current episode started more than 1 year ago. The problem is uncontrolled. Pertinent negatives include no blurred vision, malaise/fatigue, palpitations or shortness of breath. Risk factors for coronary artery disease include male gender, obesity and dyslipidemia. Current antihypertension treatment includes calcium channel blockers and angiotensin blockers. The current treatment provides mild improvement.   Hyperlipidemia  This is a chronic problem. The current episode started more than 1 year ago. Exacerbating diseases include obesity. He has no history of diabetes. Pertinent negatives include no shortness of breath. He is currently on no antihyperlipidemic treatment. Risk factors for coronary artery disease include dyslipidemia, hypertension, male sex and obesity.        I personally reviewed and updated the patient's allergies, medications, problem list, and past medical, surgical, social, and family history. I have reviewed and confirmed the accuracy of the History of Present Illness and Review of Symptoms as documented by the MA/LPN/RN. Melissa Waldron MD    Allergies:  Allergies   Allergen Reactions    Ciprofloxacin Hives       Social History:  Social History     Socioeconomic History    Marital status:    Tobacco Use    Smoking status: Former     Current packs/day: 0.00     Types: Cigarettes     Quit date: 10/21/2015     Years since quittin.6     Passive exposure: Past    Smokeless tobacco: Former   Vaping Use    Vaping status: Never Used   Substance and Sexual Activity    Alcohol use: Yes     Comment: SOCIAL    Drug use: No       Family History:  Family History   Problem Relation Age of Onset    Colon cancer Mother     Breast cancer Mother     Hypertension Father     Diabetes Maternal  Grandmother     Lung cancer Maternal Grandfather     Liver cancer Maternal Grandfather        Past Medical History :  Patient Active Problem List   Diagnosis    Obstructive sleep apnea    AK (actinic keratosis)    Edema, lower extremity    History of chicken pox    Idiopathic chronic gout of multiple sites without tophus    Inguinal hernia    Class 3 severe obesity due to excess calories with serious comorbidity and body mass index (BMI) of 45.0 to 49.9 in adult    History of tobacco use    Pain, joint, knee, right    Peptic ulcer    Post-nasal drip    Screening for prostate cancer    Seasonal allergic rhinitis due to pollen    Thrombocytopenia    Umbilical hernia    Left elbow pain    Lateral epicondylitis (tennis elbow)    Right leg pain    Chronic venous hypertension with inflammation    Trapezius muscle spasm    Neck pain    Cervical radiculopathy    Sensation of fullness in both ears    Boutonniere deformity of finger, right    Other chronic sinusitis    Polyarthralgia    Essential hypertension    Colon cancer screening    Bilateral hearing loss due to cerumen impaction    Venous stasis       Medication List:    Current Outpatient Medications:     amLODIPine (NORVASC) 5 MG tablet, Take 1 tablet by mouth Daily., Disp: 30 tablet, Rfl: 3    colchicine 0.6 MG tablet, TAKE 1 TABLET BY MOUTH 2 (TWO) TIMES A DAY AS NEEDED (GOUT FLARE)., Disp: 60 tablet, Rfl: 2    cyclobenzaprine (FLEXERIL) 10 MG tablet, Take 1 tablet by mouth At Night As Needed for Muscle Spasms., Disp: 30 tablet, Rfl: 0    ibuprofen (ADVIL,MOTRIN) 800 MG tablet, Take 1 tablet by mouth Every 8 (Eight) Hours As Needed for Mild Pain or Moderate Pain., Disp: 90 tablet, Rfl: 1    loratadine (CLARITIN) 10 MG tablet, Take 1 tablet by mouth Daily., Disp: , Rfl:     losartan (COZAAR) 100 MG tablet, Take 1 tablet by mouth Daily., Disp: 90 tablet, Rfl: 3    mupirocin (Bactroban) 2 % ointment, Apply  topically to the appropriate area as directed 3 (Three) Times  "a Day., Disp: 1 each, Rfl: 0    Probiotic Product (Probiotic-10 Ultimate) capsule, , Disp: , Rfl:     Past Surgical History:  Past Surgical History:   Procedure Laterality Date    INGUINAL HERNIA REPAIR      KNEE LIGAMENT RECONSTRUCTION      LAMINECTOMY      3     LUMBAR DISCECTOMY           Physical Exam:      Vital Signs:    Vitals:    05/12/25 1555   BP: 132/92   Pulse: 78   Resp: 19   Temp: 97.7 °F (36.5 °C)   SpO2: 99%        /92 (BP Location: Right arm, Patient Position: Sitting, Cuff Size: Large Adult)   Pulse 78   Temp 97.7 °F (36.5 °C) (Temporal)   Resp 19   Ht 193 cm (76\")   Wt (!) 171 kg (377 lb 9.6 oz)   SpO2 99% Comment: ra  BMI 45.96 kg/m²     Wt Readings from Last 3 Encounters:   05/12/25 (!) 171 kg (377 lb 9.6 oz)   04/07/25 (!) 169 kg (372 lb 9.6 oz)   03/03/25 (!) 169 kg (372 lb 3.2 oz)       Result Review :                Physical Exam  Vitals reviewed.   Constitutional:       Appearance: Normal appearance. He is well-developed.   HENT:      Head: Normocephalic and atraumatic.   Eyes:      General:         Right eye: No discharge.         Left eye: No discharge.   Cardiovascular:      Rate and Rhythm: Normal rate and regular rhythm.      Heart sounds: Normal heart sounds. No murmur heard.     No friction rub. No gallop.   Pulmonary:      Effort: Pulmonary effort is normal. No respiratory distress.      Breath sounds: Normal breath sounds. No wheezing or rales.   Musculoskeletal:        Legs:    Skin:     General: Skin is warm and dry.      Findings: No rash.   Neurological:      Mental Status: He is alert and oriented to person, place, and time.      Coordination: Coordination normal.      Gait: Gait normal.   Psychiatric:         Behavior: Behavior is cooperative.         Assessment and Plan:  Problems Addressed this Visit          Cardiac and Vasculature    Essential hypertension - Primary    Hypertension is stable and controlled  Continue current treatment regimen.  Dietary sodium " restriction.  Weight loss.  Blood pressure will be reassessed in 3 months.    128/72 at work         Relevant Orders    Comprehensive Metabolic Panel (Completed)    Venous stasis    Bilateral legs  Discussed compression socks            Endocrine and Metabolic    Class 3 severe obesity due to excess calories with serious comorbidity and body mass index (BMI) of 45.0 to 49.9 in adult    Patient's (Body mass index is 45.96 kg/m².) indicates that they are morbidly/severely obese (BMI > 40 or > 35 with obesity - related health condition) with health conditions that include hypertension and dyslipidemias . Weight is worsening. BMI  is above average; BMI management plan is completed. We discussed low calorie, low carb based diet program, portion control, and increasing exercise.             Tobacco    History of tobacco use     Other Visit Diagnoses         Dyslipidemia        Relevant Orders    Lipid Panel With / Chol / HDL Ratio (Completed)      Abrasion        none currently. but occurs bc of work. refill bactroban    Relevant Medications    mupirocin (Bactroban) 2 % ointment          Diagnoses         Codes Comments      Essential hypertension    -  Primary ICD-10-CM: I10  ICD-9-CM: 401.9       Dyslipidemia     ICD-10-CM: E78.5  ICD-9-CM: 272.4       History of tobacco use     ICD-10-CM: Z87.891  ICD-9-CM: V15.82       Class 3 severe obesity due to excess calories with serious comorbidity and body mass index (BMI) of 45.0 to 49.9 in adult     ICD-10-CM: E66.813, Z68.42, E66.01  ICD-9-CM: 278.01, V85.42       Abrasion     ICD-10-CM: T14.8XXA  ICD-9-CM: 919.0 none currently. but occurs bc of work. refill bactroban      Venous stasis     ICD-10-CM: I87.8  ICD-9-CM: 459.81                          An After Visit Summary and PPPS were given to the patient.       This document is intended for medical expert use only. Reading of this document by patients and/or patient's family without participating medical staff guidance  may result in misinterpretation and unintended morbidity. Any interpretation of such data is the responsibility of the patient and/or family member responsible for the patient in concert with their primary or specialist providers, not to be left for sources of online searches such as [facility], [employer] or similar queries. Relying on these approaches to knowledge may result in misinterpretation, misguided goals of care and even death should patients or family members try recommendations outside of the realm of professional medical care.

## 2025-05-12 ENCOUNTER — OFFICE VISIT (OUTPATIENT)
Dept: FAMILY MEDICINE CLINIC | Facility: CLINIC | Age: 60
End: 2025-05-12
Payer: COMMERCIAL

## 2025-05-12 VITALS
HEIGHT: 76 IN | RESPIRATION RATE: 19 BRPM | DIASTOLIC BLOOD PRESSURE: 92 MMHG | OXYGEN SATURATION: 99 % | HEART RATE: 78 BPM | BODY MASS INDEX: 38.36 KG/M2 | TEMPERATURE: 97.7 F | WEIGHT: 315 LBS | SYSTOLIC BLOOD PRESSURE: 132 MMHG

## 2025-05-12 DIAGNOSIS — E78.5 DYSLIPIDEMIA: ICD-10-CM

## 2025-05-12 DIAGNOSIS — Z87.891 HISTORY OF TOBACCO USE: ICD-10-CM

## 2025-05-12 DIAGNOSIS — E66.01 CLASS 3 SEVERE OBESITY DUE TO EXCESS CALORIES WITH SERIOUS COMORBIDITY AND BODY MASS INDEX (BMI) OF 45.0 TO 49.9 IN ADULT: ICD-10-CM

## 2025-05-12 DIAGNOSIS — I87.8 VENOUS STASIS: ICD-10-CM

## 2025-05-12 DIAGNOSIS — E66.813 CLASS 3 SEVERE OBESITY DUE TO EXCESS CALORIES WITH SERIOUS COMORBIDITY AND BODY MASS INDEX (BMI) OF 45.0 TO 49.9 IN ADULT: ICD-10-CM

## 2025-05-12 DIAGNOSIS — I10 ESSENTIAL HYPERTENSION: Primary | ICD-10-CM

## 2025-05-12 DIAGNOSIS — T14.8XXA ABRASION: ICD-10-CM

## 2025-05-12 PROCEDURE — 99214 OFFICE O/P EST MOD 30 MIN: CPT | Performed by: FAMILY MEDICINE

## 2025-05-12 RX ORDER — MUPIROCIN 20 MG/G
OINTMENT TOPICAL 3 TIMES DAILY
Qty: 1 EACH | Refills: 0 | Status: SHIPPED | OUTPATIENT
Start: 2025-05-12

## 2025-05-12 NOTE — ASSESSMENT & PLAN NOTE
Hypertension is stable and controlled  Continue current treatment regimen.  Dietary sodium restriction.  Weight loss.  Blood pressure will be reassessed in 3 months.    128/72 at work

## 2025-05-23 LAB
ALBUMIN SERPL-MCNC: 4.3 G/DL (ref 3.9–4.9)
ALP SERPL-CCNC: 77 IU/L (ref 44–121)
ALT SERPL-CCNC: 30 IU/L (ref 0–44)
AST SERPL-CCNC: 29 IU/L (ref 0–40)
BILIRUB SERPL-MCNC: 0.4 MG/DL (ref 0–1.2)
BUN SERPL-MCNC: 17 MG/DL (ref 8–27)
BUN/CREAT SERPL: 19 (ref 10–24)
CALCIUM SERPL-MCNC: 9.2 MG/DL (ref 8.6–10.2)
CHLORIDE SERPL-SCNC: 103 MMOL/L (ref 96–106)
CHOLEST SERPL-MCNC: 96 MG/DL (ref 100–199)
CHOLEST/HDLC SERPL: 2.2 RATIO (ref 0–5)
CO2 SERPL-SCNC: 23 MMOL/L (ref 20–29)
CREAT SERPL-MCNC: 0.89 MG/DL (ref 0.76–1.27)
EGFRCR SERPLBLD CKD-EPI 2021: 97 ML/MIN/1.73
GLOBULIN SER CALC-MCNC: 1.9 G/DL (ref 1.5–4.5)
GLUCOSE SERPL-MCNC: 95 MG/DL (ref 70–99)
HDLC SERPL-MCNC: 44 MG/DL
LDLC SERPL CALC-MCNC: 34 MG/DL (ref 0–99)
POTASSIUM SERPL-SCNC: 4.3 MMOL/L (ref 3.5–5.2)
PROT SERPL-MCNC: 6.2 G/DL (ref 6–8.5)
SODIUM SERPL-SCNC: 140 MMOL/L (ref 134–144)
TRIGL SERPL-MCNC: 96 MG/DL (ref 0–149)
VLDLC SERPL CALC-MCNC: 18 MG/DL (ref 5–40)

## 2025-05-26 NOTE — ASSESSMENT & PLAN NOTE
Patient's (Body mass index is 45.96 kg/m².) indicates that they are morbidly/severely obese (BMI > 40 or > 35 with obesity - related health condition) with health conditions that include hypertension and dyslipidemias . Weight is worsening. BMI  is above average; BMI management plan is completed. We discussed low calorie, low carb based diet program, portion control, and increasing exercise.

## 2025-06-10 RX ORDER — IBUPROFEN 800 MG/1
800 TABLET, FILM COATED ORAL EVERY 8 HOURS PRN
Qty: 90 TABLET | Refills: 1 | Status: SHIPPED | OUTPATIENT
Start: 2025-06-10

## 2025-07-14 NOTE — PROGRESS NOTES
"  Chief Complaint   Patient presents with    Annual Exam    Hypertension       Subjective   Alexander Wayne is a 60 y.o. male here for a Annual Visit.     Last Physical Exam: 24 Previous physical was performed by  Melissa Waldron MD  General Health:good  Nutrition:in general, a \"healthy\" diet    Exercise Level:irregularly  Sleep:fairly well  Hours of Sleep:5  Libido:fair  Self Skin Exam: occasionally  Monthly Testicular Self Exam: Does not perform monthly testicular exam    Colonoscopy:   Last Completed Colonoscopy    This patient has no relevant Health Maintenance data.      Last colonoscopy Age 47 in [place] , per pt was normal    PSA:   Lab Results   Component Value Date    PSA 1.3 2023    PSA 0.7 08/10/2018    PSA 0.6 2016           I personally reviewed and updated the patient's allergies, medications, problem list, and past medical, surgical, social, and family history.     Allergies:  Allergies   Allergen Reactions    Ciprofloxacin Hives       Social History:  Social History     Socioeconomic History    Marital status:    Tobacco Use    Smoking status: Former     Current packs/day: 0.00     Types: Cigarettes     Quit date: 10/21/2015     Years since quittin.7     Passive exposure: Past    Smokeless tobacco: Former   Vaping Use    Vaping status: Never Used   Substance and Sexual Activity    Alcohol use: Yes     Comment: SOCIAL    Drug use: No       Family History:  Family History   Problem Relation Age of Onset    Colon cancer Mother     Breast cancer Mother     Hypertension Father     Diabetes Maternal Grandmother     Lung cancer Maternal Grandfather     Liver cancer Maternal Grandfather        Past Medical History :  Active Ambulatory Problems     Diagnosis Date Noted    Obstructive sleep apnea 2016    AK (actinic keratosis) 10/20/2020    Edema, lower extremity 10/20/2020    History of chicken pox 10/20/2020    Idiopathic chronic gout of multiple sites without tophus " 10/20/2020    Inguinal hernia 10/20/2020    Class 3 severe obesity due to excess calories with serious comorbidity and body mass index (BMI) of 40.0 to 44.9 in adult 10/20/2020    History of tobacco use 10/20/2020    Pain, joint, knee, right 10/20/2020    Peptic ulcer 10/20/2020    Post-nasal drip 10/20/2020    Screening for prostate cancer 10/20/2020    Seasonal allergic rhinitis due to pollen 10/20/2020    Thrombocytopenia 10/20/2020    Umbilical hernia 10/20/2020    Left elbow pain 10/20/2020    Lateral epicondylitis (tennis elbow) 10/20/2020    Right leg pain 12/01/2020    Chronic venous hypertension with inflammation 02/12/2021    Trapezius muscle spasm 05/17/2021    Neck pain 05/17/2021    Cervical radiculopathy 05/17/2021    Sensation of fullness in both ears 12/05/2022    Boutonniere deformity of finger, right 09/16/2024    Other chronic sinusitis 09/16/2024    Polyarthralgia 10/17/2024    Essential hypertension 11/07/2024    Colon cancer screening 11/07/2024    Bilateral hearing loss due to cerumen impaction 03/03/2025    Venous stasis 05/12/2025     Resolved Ambulatory Problems     Diagnosis Date Noted    Bilateral hearing loss 10/20/2020    Cellulitis of right lower extremity 10/20/2020    RUQ pain 10/20/2020    Acute non-recurrent frontal sinusitis 05/06/2022    Bilateral impacted cerumen 12/05/2022     Past Medical History:   Diagnosis Date    Chronic idiopathic gout of multiple sites without tophus     Seasonal allergic rhinitis     Sleep apnea        Medication List:    Current Outpatient Medications:     amLODIPine (NORVASC) 5 MG tablet, Take 1 tablet by mouth Daily., Disp: 30 tablet, Rfl: 3    colchicine 0.6 MG tablet, TAKE 1 TABLET BY MOUTH 2 (TWO) TIMES A DAY AS NEEDED (GOUT FLARE)., Disp: 60 tablet, Rfl: 2    cyclobenzaprine (FLEXERIL) 10 MG tablet, Take 1 tablet by mouth At Night As Needed for Muscle Spasms., Disp: 30 tablet, Rfl: 2    ibuprofen (ADVIL,MOTRIN) 800 MG tablet, TAKE 1 TABLET BY  "MOUTH EVERY 8 (EIGHT) HOURS AS NEEDED FOR MILD PAIN OR MODERATE PAIN., Disp: 90 tablet, Rfl: 1    loratadine (CLARITIN) 10 MG tablet, Take 1 tablet by mouth Daily., Disp: , Rfl:     losartan (COZAAR) 100 MG tablet, Take 1 tablet by mouth Daily., Disp: 90 tablet, Rfl: 3    multivitamin with minerals tablet tablet, Take 1 tablet by mouth Daily., Disp: , Rfl:     mupirocin (Bactroban) 2 % ointment, Apply  topically to the appropriate area as directed 3 (Three) Times a Day., Disp: 1 each, Rfl: 2    Probiotic Product (Probiotic-10 Ultimate) capsule, , Disp: , Rfl:     Past Surgical History:  Past Surgical History:   Procedure Laterality Date    INGUINAL HERNIA REPAIR      KNEE LIGAMENT RECONSTRUCTION      LAMINECTOMY      3     LUMBAR DISCECTOMY         Depression Screen:       7/18/2025     9:53 AM   PHQ-2/PHQ-9 Depression Screening   Little interest or pleasure in doing things Not at all   Feeling down, depressed, or hopeless Not at all   How difficult have these problems made it for you to do your work, take care of things at home, or get along with other people? Not difficult at all       Fall Risk Screen:  HAILE Fall Risk Assessment has not been completed.      Physical Exam:  Vital Signs:  Visit Vitals  /72 (BP Location: Right arm, Patient Position: Sitting, Cuff Size: Adult)   Pulse 65   Temp 96.9 °F (36.1 °C) (Temporal)   Resp 19   Ht 193 cm (76\")   Wt (!) 162 kg (357 lb 9.6 oz)   SpO2 97% Comment: ra   BMI 43.53 kg/m²       Result Review :                  Assessment and Plan:  Problem List Items Addressed This Visit          Cardiac and Vasculature    Essential hypertension    Current Assessment & Plan   Hypertension is stable and controlled  Continue current treatment regimen.  Dietary sodium restriction.  Weight loss.  Blood pressure will be reassessed in 3 months.         Relevant Orders    CBC & Differential    TSH       Endocrine and Metabolic    Class 3 severe obesity due to excess calories with " serious comorbidity and body mass index (BMI) of 40.0 to 44.9 in adult    Current Assessment & Plan   Patient's (Body mass index is 43.53 kg/m².) indicates that they are morbidly/severely obese (BMI > 40 or > 35 with obesity - related health condition) with health conditions that include hypertension . Weight is improving with lifestyle modifications. BMI  is above average; BMI management plan is completed. We discussed low calorie, low carb based diet program, portion control, and increasing exercise.             Gastrointestinal Abdominal     Colon cancer screening    Current Assessment & Plan   Referral for colonscopy  He has packet for [facility]         Relevant Orders    Ambulatory Referral For Screening Colonoscopy (Completed)       Musculoskeletal and Injuries    Trapezius muscle spasm    Current Assessment & Plan   Ice three times a day for about 10-15 minutes for the first 1-2 days. Then may alternate heat and ice. Better body mechanics discussed. Home exercises discussed and hand out given. Discussed nsaids and if they can be taken. May need imaging and or PT if persists.  Discussed red flags, if there is severe pain, fever with pain, loss of movement in one or both legs pain, numbness in groin or both legs, trouble urinating or defecating on oneself, then patient is to go to the ER.            Relevant Medications    cyclobenzaprine (FLEXERIL) 10 MG tablet       Neuro    Cervical radiculopathy    Current Assessment & Plan   Ice three times a day for about 10-15 minutes for the first 1-2 days. Then may alternate heat and ice. Better body mechanics discussed. Home exercises discussed and hand out given. Discussed nsaids and if they can be taken. May need imaging and or PT if persists.  Discussed red flags, if there is severe pain, fever with pain, loss of movement in one or both legs pain, numbness in groin or both legs, trouble urinating or defecating on oneself, then patient is to go to the ER.             Relevant Medications    cyclobenzaprine (FLEXERIL) 10 MG tablet       Tobacco    History of tobacco use     Other Visit Diagnoses         Encounter for general adult medical examination with abnormal findings    -  Primary      Dyslipidemia        Relevant Orders    Comprehensive Metabolic Panel      Screening PSA (prostate specific antigen)        Relevant Orders    PSA Screen      Abrasion        none currently. but occurs bc of work. refill bactroban    Relevant Medications    mupirocin (Bactroban) 2 % ointment                   An After Visit Summary and PPPS were given to the patient.       Discussed injury prevention, diet and exercise, safe sexual practices, and screening for common diseases. Encouraged use of sunscreen and seatbelts. Discussed timing of screenings. Encouraged monthly testicular exams, yearly physical exams. Avoidance of tobacco encouraged. Limitation or avoidance of alcohol encouraged. Recommend yearly dental and eye exams. Also discussed monitoring of blood pressure, lipids.         +++++E/M portion medically necessary secondary to new or uncontrolled chronic problem+++++++    Subjective   Alexander Wayne is here for:    Chief Complaint   Patient presents with    Annual Exam    Hypertension       Hypertension  Chronicity:  Chronic  Onset:  More than 1 year ago  Condition status:  Controlled  Associated symptoms: headaches    Associated symptoms: no chest pain, no palpitations and no dyspnea with exertion    CAD risks:  Dyslipidemia, obesity and male gender  Current therapy:  Angiotensin blockers and calcium channel blockers  Improvement on treatment:  Moderate        Physical Exam:  Review of Systems   Cardiovascular:  Negative for chest pain and palpitations.        Physical Exam  Vitals reviewed.   Constitutional:       General: He is not in acute distress.     Appearance: He is well-developed. He is not diaphoretic.   HENT:      Head: Normocephalic and atraumatic.      Right Ear: Tympanic  membrane and external ear normal.      Left Ear: Tympanic membrane and external ear normal.      Nose: Nose normal.      Mouth/Throat:      Pharynx: No oropharyngeal exudate.   Eyes:      General: No scleral icterus.        Right eye: No discharge.         Left eye: No discharge.      Conjunctiva/sclera: Conjunctivae normal.      Pupils: Pupils are equal, round, and reactive to light.   Neck:      Thyroid: No thyromegaly.      Trachea: No tracheal deviation.   Cardiovascular:      Rate and Rhythm: Normal rate and regular rhythm.      Heart sounds: Normal heart sounds. No murmur heard.     No friction rub. No gallop.   Pulmonary:      Effort: Pulmonary effort is normal. No respiratory distress.      Breath sounds: Normal breath sounds. No stridor. No wheezing or rales.   Abdominal:      General: Bowel sounds are normal. There is no distension.      Palpations: Abdomen is soft. There is no mass.      Tenderness: There is no abdominal tenderness. There is no guarding or rebound.   Musculoskeletal:         General: No deformity. Normal range of motion.      Cervical back: Normal range of motion and neck supple. No tenderness. Normal range of motion.      Thoracic back: Spasms and tenderness present.   Skin:     General: Skin is warm and dry.      Capillary Refill: Capillary refill takes less than 2 seconds.      Coloration: Skin is not pale.      Findings: No erythema or rash.   Neurological:      Mental Status: He is alert and oriented to person, place, and time. He is not disoriented.      Cranial Nerves: No cranial nerve deficit.      Sensory: No sensory deficit.      Motor: No tremor, atrophy or abnormal muscle tone.      Coordination: Coordination normal.      Gait: Gait normal.      Deep Tendon Reflexes: Reflexes are normal and symmetric. Reflexes normal.   Psychiatric:         Behavior: Behavior normal.         Thought Content: Thought content normal.         Judgment: Judgment normal.         Assessment and  Plan:  Problem List Items Addressed This Visit          Cardiac and Vasculature    Essential hypertension    Current Assessment & Plan   Hypertension is stable and controlled  Continue current treatment regimen.  Dietary sodium restriction.  Weight loss.  Blood pressure will be reassessed in 3 months.         Relevant Orders    CBC & Differential    TSH       Endocrine and Metabolic    Class 3 severe obesity due to excess calories with serious comorbidity and body mass index (BMI) of 40.0 to 44.9 in adult    Current Assessment & Plan   Patient's (Body mass index is 43.53 kg/m².) indicates that they are morbidly/severely obese (BMI > 40 or > 35 with obesity - related health condition) with health conditions that include hypertension . Weight is improving with lifestyle modifications. BMI  is above average; BMI management plan is completed. We discussed low calorie, low carb based diet program, portion control, and increasing exercise.             Gastrointestinal Abdominal     Colon cancer screening    Current Assessment & Plan   Referral for colonscopy  He has packet for [facility]         Relevant Orders    Ambulatory Referral For Screening Colonoscopy (Completed)       Musculoskeletal and Injuries    Trapezius muscle spasm    Current Assessment & Plan   Ice three times a day for about 10-15 minutes for the first 1-2 days. Then may alternate heat and ice. Better body mechanics discussed. Home exercises discussed and hand out given. Discussed nsaids and if they can be taken. May need imaging and or PT if persists.  Discussed red flags, if there is severe pain, fever with pain, loss of movement in one or both legs pain, numbness in groin or both legs, trouble urinating or defecating on oneself, then patient is to go to the ER.            Relevant Medications    cyclobenzaprine (FLEXERIL) 10 MG tablet       Neuro    Cervical radiculopathy    Current Assessment & Plan   Ice three times a day for about 10-15 minutes for the  first 1-2 days. Then may alternate heat and ice. Better body mechanics discussed. Home exercises discussed and hand out given. Discussed nsaids and if they can be taken. May need imaging and or PT if persists.  Discussed red flags, if there is severe pain, fever with pain, loss of movement in one or both legs pain, numbness in groin or both legs, trouble urinating or defecating on oneself, then patient is to go to the ER.            Relevant Medications    cyclobenzaprine (FLEXERIL) 10 MG tablet       Tobacco    History of tobacco use     Other Visit Diagnoses         Encounter for general adult medical examination with abnormal findings    -  Primary      Dyslipidemia        Relevant Orders    Comprehensive Metabolic Panel      Screening PSA (prostate specific antigen)        Relevant Orders    PSA Screen      Abrasion        none currently. but occurs bc of work. refill bactroban    Relevant Medications    mupirocin (Bactroban) 2 % ointment

## 2025-07-18 ENCOUNTER — OFFICE VISIT (OUTPATIENT)
Dept: FAMILY MEDICINE CLINIC | Facility: CLINIC | Age: 60
End: 2025-07-18
Payer: COMMERCIAL

## 2025-07-18 VITALS
WEIGHT: 315 LBS | OXYGEN SATURATION: 97 % | DIASTOLIC BLOOD PRESSURE: 72 MMHG | HEIGHT: 76 IN | RESPIRATION RATE: 19 BRPM | HEART RATE: 65 BPM | BODY MASS INDEX: 38.36 KG/M2 | TEMPERATURE: 96.9 F | SYSTOLIC BLOOD PRESSURE: 122 MMHG

## 2025-07-18 DIAGNOSIS — E66.813 CLASS 3 SEVERE OBESITY DUE TO EXCESS CALORIES WITH SERIOUS COMORBIDITY AND BODY MASS INDEX (BMI) OF 40.0 TO 44.9 IN ADULT: ICD-10-CM

## 2025-07-18 DIAGNOSIS — I10 ESSENTIAL HYPERTENSION: ICD-10-CM

## 2025-07-18 DIAGNOSIS — M62.838 TRAPEZIUS MUSCLE SPASM: ICD-10-CM

## 2025-07-18 DIAGNOSIS — Z12.5 SCREENING PSA (PROSTATE SPECIFIC ANTIGEN): ICD-10-CM

## 2025-07-18 DIAGNOSIS — Z87.891 HISTORY OF TOBACCO USE: ICD-10-CM

## 2025-07-18 DIAGNOSIS — Z12.11 COLON CANCER SCREENING: ICD-10-CM

## 2025-07-18 DIAGNOSIS — Z00.01 ENCOUNTER FOR GENERAL ADULT MEDICAL EXAMINATION WITH ABNORMAL FINDINGS: Primary | ICD-10-CM

## 2025-07-18 DIAGNOSIS — E78.5 DYSLIPIDEMIA: ICD-10-CM

## 2025-07-18 DIAGNOSIS — T14.8XXA ABRASION: ICD-10-CM

## 2025-07-18 DIAGNOSIS — M54.12 CERVICAL RADICULOPATHY: ICD-10-CM

## 2025-07-18 RX ORDER — MULTIPLE VITAMINS W/ MINERALS TAB 9MG-400MCG
1 TAB ORAL DAILY
COMMUNITY

## 2025-07-18 RX ORDER — CYCLOBENZAPRINE HCL 10 MG
10 TABLET ORAL NIGHTLY PRN
Qty: 30 TABLET | Refills: 2 | Status: SHIPPED | OUTPATIENT
Start: 2025-07-18

## 2025-07-18 RX ORDER — MUPIROCIN 2 %
OINTMENT (GRAM) TOPICAL 3 TIMES DAILY
Qty: 1 EACH | Refills: 2 | Status: SHIPPED | OUTPATIENT
Start: 2025-07-18

## 2025-07-27 NOTE — ASSESSMENT & PLAN NOTE
Patient's (Body mass index is 43.53 kg/m².) indicates that they are morbidly/severely obese (BMI > 40 or > 35 with obesity - related health condition) with health conditions that include hypertension . Weight is improving with lifestyle modifications. BMI  is above average; BMI management plan is completed. We discussed low calorie, low carb based diet program, portion control, and increasing exercise.

## 2025-07-29 LAB
ALBUMIN SERPL-MCNC: 4.2 G/DL (ref 3.9–4.9)
ALP SERPL-CCNC: 76 IU/L (ref 44–121)
ALT SERPL-CCNC: 26 IU/L (ref 0–44)
AST SERPL-CCNC: 28 IU/L (ref 0–40)
BASOPHILS # BLD AUTO: 0 X10E3/UL (ref 0–0.2)
BASOPHILS NFR BLD AUTO: 1 %
BILIRUB SERPL-MCNC: 0.6 MG/DL (ref 0–1.2)
BUN SERPL-MCNC: 18 MG/DL (ref 8–27)
BUN/CREAT SERPL: 21 (ref 10–24)
CALCIUM SERPL-MCNC: 9.6 MG/DL (ref 8.6–10.2)
CHLORIDE SERPL-SCNC: 104 MMOL/L (ref 96–106)
CO2 SERPL-SCNC: 21 MMOL/L (ref 20–29)
CREAT SERPL-MCNC: 0.87 MG/DL (ref 0.76–1.27)
EGFRCR SERPLBLD CKD-EPI 2021: 98 ML/MIN/1.73
EOSINOPHIL # BLD AUTO: 0.2 X10E3/UL (ref 0–0.4)
EOSINOPHIL NFR BLD AUTO: 3 %
ERYTHROCYTE [DISTWIDTH] IN BLOOD BY AUTOMATED COUNT: 12.7 % (ref 11.6–15.4)
GLOBULIN SER CALC-MCNC: 2.1 G/DL (ref 1.5–4.5)
GLUCOSE SERPL-MCNC: 133 MG/DL (ref 70–99)
HCT VFR BLD AUTO: 44.2 % (ref 37.5–51)
HGB BLD-MCNC: 14.7 G/DL (ref 13–17.7)
IMM GRANULOCYTES # BLD AUTO: 0 X10E3/UL (ref 0–0.1)
IMM GRANULOCYTES NFR BLD AUTO: 0 %
LYMPHOCYTES # BLD AUTO: 2.1 X10E3/UL (ref 0.7–3.1)
LYMPHOCYTES NFR BLD AUTO: 36 %
MCH RBC QN AUTO: 30.6 PG (ref 26.6–33)
MCHC RBC AUTO-ENTMCNC: 33.3 G/DL (ref 31.5–35.7)
MCV RBC AUTO: 92 FL (ref 79–97)
MONOCYTES # BLD AUTO: 0.6 X10E3/UL (ref 0.1–0.9)
MONOCYTES NFR BLD AUTO: 10 %
NEUTROPHILS # BLD AUTO: 2.9 X10E3/UL (ref 1.4–7)
NEUTROPHILS NFR BLD AUTO: 50 %
PLATELET # BLD AUTO: 142 X10E3/UL (ref 150–450)
POTASSIUM SERPL-SCNC: 3.7 MMOL/L (ref 3.5–5.2)
PROT SERPL-MCNC: 6.3 G/DL (ref 6–8.5)
PSA SERPL-MCNC: 1.1 NG/ML (ref 0–4)
RBC # BLD AUTO: 4.8 X10E6/UL (ref 4.14–5.8)
SODIUM SERPL-SCNC: 142 MMOL/L (ref 134–144)
TSH SERPL DL<=0.005 MIU/L-ACNC: 1.06 UIU/ML (ref 0.45–4.5)
WBC # BLD AUTO: 5.9 X10E3/UL (ref 3.4–10.8)

## 2025-08-13 DIAGNOSIS — I10 ESSENTIAL HYPERTENSION: ICD-10-CM

## 2025-08-13 RX ORDER — AMLODIPINE BESYLATE 5 MG/1
5 TABLET ORAL DAILY
Qty: 90 TABLET | Refills: 3 | Status: SHIPPED | OUTPATIENT
Start: 2025-08-13

## 2025-08-13 RX ORDER — IBUPROFEN 800 MG/1
800 TABLET, FILM COATED ORAL EVERY 8 HOURS PRN
Qty: 90 TABLET | Refills: 1 | Status: SHIPPED | OUTPATIENT
Start: 2025-08-13